# Patient Record
Sex: MALE
[De-identification: names, ages, dates, MRNs, and addresses within clinical notes are randomized per-mention and may not be internally consistent; named-entity substitution may affect disease eponyms.]

---

## 2021-07-12 ENCOUNTER — TRANSCRIPTION ENCOUNTER (OUTPATIENT)
Age: 51
End: 2021-07-12

## 2021-08-08 ENCOUNTER — TRANSCRIPTION ENCOUNTER (OUTPATIENT)
Age: 51
End: 2021-08-08

## 2021-08-10 ENCOUNTER — TRANSCRIPTION ENCOUNTER (OUTPATIENT)
Age: 51
End: 2021-08-10

## 2022-05-17 PROBLEM — Z00.00 ENCOUNTER FOR PREVENTIVE HEALTH EXAMINATION: Status: ACTIVE | Noted: 2022-05-17

## 2022-07-27 ENCOUNTER — APPOINTMENT (OUTPATIENT)
Dept: NEUROSURGERY | Facility: CLINIC | Age: 52
End: 2022-07-27

## 2022-07-27 VITALS
SYSTOLIC BLOOD PRESSURE: 156 MMHG | HEART RATE: 88 BPM | BODY MASS INDEX: 24.5 KG/M2 | DIASTOLIC BLOOD PRESSURE: 89 MMHG | WEIGHT: 175 LBS | HEIGHT: 71 IN | TEMPERATURE: 97.8 F | OXYGEN SATURATION: 98 %

## 2022-07-27 PROCEDURE — 99203 OFFICE O/P NEW LOW 30 MIN: CPT

## 2022-07-28 NOTE — CONSULT LETTER
[Dear  ___] : Dear  [unfilled], [Courtesy Letter:] : I had the pleasure of seeing your patient, [unfilled], in my office today. [Sincerely,] : Sincerely, [FreeTextEntry2] : Karlie Morel MD\[address]\[place], [place], [address] [FreeTextEntry1] : Mr. Niño is a very pleasant 52-year-old male patient who presented to our office today to review his MRI scan findings.\par \par The patient endorses an approximate 4-month history of left-sided neck pain radiating into the shoulder and to the back of the head.  The patient does not recall any specific inciting event but states that he has been very reluctant to move his head since the onset for fear of making things worse.  The patient does not have any numbness or tingling or any radiating symptoms into his hands.  The patient has been taking Tylenol as needed for his pain and had participated in 1 or 2 sessions of physical therapy previously.\par \par On examination, the patient is alert, oriented, and compliant with the exam.  The patient demonstrates full strength in the upper and lower extremities bilaterally.  The patient demonstrates 2+ reflexes in the upper and lower extremities bilaterally.  The patient ambulates well.  The patient has severely limited range of motion of the neck secondary to pain and apprehension.\par \par The patient's past medical history is noncontributory.  The patient denies allergies to medications.\par \par The patient is accompanied with an MRI scan of the cervical spine dated July 15, 2022.  These images demonstrate mild to moderate degenerative changes in the cervical spine without nerve root or cord compression. The patient additionally is accompanied with an x-ray of the cervical spine dated May 11, 2022 which was reported unremarkable.  The patient does have a slightly rightward tilt possibly suggestive of a muscle spasm.  The patient maintains good cervical lordosis.\par \par Taken together, the patient has a clinical history and radiographic findings most consistent with a muscle spasm.  Unfortunately, it appears that the patient has been so reluctant to move his neck that he has exacerbated muscle disuse and the muscle spasm.  I have provided the patient a prescription for muscle relaxants in an effort to loosen his neck and reassured the patient all of his MRI scans are benign.  From a recovery perspective, I explained to the patient that he must continue to move his neck and to perform stretching exercises to loosen the muscles.  I have also provided a prescription for physical therapy and massage therapy to help with this.  Finally, I explained to the patient that, in severe instances, the patient may be a candidate for Botox injections by pain management physician to help loosen the muscle.  At this time, the patient would like to defer this particular modality.  At this time I explained the patient that he does not need routine neurosurgical follow-up but we would be happy to help the patient in the future should the need arise. [FreeTextEntry3] : Cy Abbott MD, PhD, CS, FAANS Attending Neurosurgeon  of Neurosurgery [facility] at [facility] at [address], [place] [address] Office: [phone] Fax: [phone]

## 2022-08-18 ENCOUNTER — RX CHANGE (OUTPATIENT)
Age: 52
End: 2022-08-18

## 2022-08-18 ENCOUNTER — APPOINTMENT (OUTPATIENT)
Dept: GASTROENTEROLOGY | Facility: CLINIC | Age: 52
End: 2022-08-18

## 2022-08-18 VITALS
HEIGHT: 71 IN | WEIGHT: 179 LBS | HEART RATE: 104 BPM | BODY MASS INDEX: 25.06 KG/M2 | SYSTOLIC BLOOD PRESSURE: 172 MMHG | DIASTOLIC BLOOD PRESSURE: 105 MMHG

## 2022-08-18 DIAGNOSIS — R49.0 DYSPHONIA: ICD-10-CM

## 2022-08-19 ENCOUNTER — RX CHANGE (OUTPATIENT)
Age: 52
End: 2022-08-19

## 2022-08-31 ENCOUNTER — APPOINTMENT (OUTPATIENT)
Dept: GASTROENTEROLOGY | Facility: AMBULATORY MEDICAL SERVICES | Age: 52
End: 2022-08-31

## 2022-09-02 ENCOUNTER — NON-APPOINTMENT (OUTPATIENT)
Age: 52
End: 2022-09-02

## 2022-09-06 ENCOUNTER — APPOINTMENT (OUTPATIENT)
Dept: GASTROENTEROLOGY | Facility: AMBULATORY MEDICAL SERVICES | Age: 52
End: 2022-09-06

## 2022-09-06 ENCOUNTER — RESULT REVIEW (OUTPATIENT)
Age: 52
End: 2022-09-06

## 2022-09-06 DIAGNOSIS — K25.3 ACUTE GASTRIC ULCER W/OUT HEMORRHAGE OR PERFORATION: ICD-10-CM

## 2022-09-06 PROCEDURE — 43239 EGD BIOPSY SINGLE/MULTIPLE: CPT | Mod: GC

## 2022-09-14 ENCOUNTER — APPOINTMENT (OUTPATIENT)
Dept: PHYSICAL MEDICINE AND REHAB | Facility: CLINIC | Age: 52
End: 2022-09-14

## 2022-10-04 ENCOUNTER — APPOINTMENT (OUTPATIENT)
Dept: PHYSICAL MEDICINE AND REHAB | Facility: CLINIC | Age: 52
End: 2022-10-04

## 2022-10-04 VITALS
RESPIRATION RATE: 12 BRPM | HEIGHT: 67 IN | BODY MASS INDEX: 27.15 KG/M2 | WEIGHT: 173 LBS | DIASTOLIC BLOOD PRESSURE: 110 MMHG | HEART RATE: 99 BPM | SYSTOLIC BLOOD PRESSURE: 175 MMHG

## 2022-10-04 DIAGNOSIS — Z78.9 OTHER SPECIFIED HEALTH STATUS: ICD-10-CM

## 2022-10-04 PROCEDURE — 99204 OFFICE O/P NEW MOD 45 MIN: CPT | Mod: 25

## 2022-10-04 PROCEDURE — 20552 NJX 1/MLT TRIGGER POINT 1/2: CPT

## 2022-10-04 RX ORDER — FLUCONAZOLE 100 MG/1
100 TABLET ORAL DAILY
Qty: 10 | Refills: 1 | Status: COMPLETED | COMMUNITY
Start: 2022-09-06 | End: 2022-10-04

## 2022-10-04 RX ORDER — AMOXICILLIN 500 MG/1
500 TABLET, FILM COATED ORAL
Qty: 58 | Refills: 0 | Status: COMPLETED | COMMUNITY
Start: 2022-09-09 | End: 2022-10-04

## 2022-10-04 RX ORDER — CLARITHROMYCIN 500 MG/1
500 TABLET, FILM COATED ORAL TWICE DAILY
Qty: 28 | Refills: 0 | Status: COMPLETED | COMMUNITY
Start: 2022-09-09 | End: 2022-10-04

## 2022-10-04 RX ORDER — GABAPENTIN 300 MG/1
300 CAPSULE ORAL
Qty: 60 | Refills: 1 | Status: ACTIVE | COMMUNITY
Start: 2022-10-04 | End: 1900-01-01

## 2022-10-04 RX ORDER — SODIUM SULFATE, POTASSIUM SULFATE, MAGNESIUM SULFATE 17.5; 3.13; 1.6 G/ML; G/ML; G/ML
17.5-3.13-1.6 SOLUTION, CONCENTRATE ORAL
Qty: 1 | Refills: 0 | Status: COMPLETED | COMMUNITY
Start: 2022-08-18 | End: 2022-10-04

## 2022-10-04 NOTE — PROCEDURE
[de-identified] : Reason for procedure: LEFT CERVICAL MYOFASCIAL PAIN\par \par Procedure: TRIGGER POINT INJECTIONS\par Physician: LUIS ANGEL SILVA DO\par Medication injected: 1 cc dexamethasone, 3 cc Lidocaine 1% (total)\par Sedation medications: None\par Estimated blood loss: None\par Complications: None\par \par Technique: R/B/A to LEFT MID-UPPER CERVICAL trigger point injections reviewed with patient. The patient is agreeable and wishes to proceed.  Signed consent form to be scanned into EMR.  The patient was placed in a seated position and 3 trigger points of  LEFT MID-UPPER CERVICAL PARAVERTEBRAL MUSCLES were identified. The areas to be injected were prepped in normal sterile fashion with Chloroprep.  A 27 gauge, 1.25 inch needle was advanced into each palpable trigger point with reproduction of pain.  After negative aspiration of heme, the above medications were injected into the trigger area. Needle was then removed, band aid placed over injection sites. There were no complications.  The patient was provided with post injection instructions and noted improvement in pain and ROM after injection. \par \par

## 2022-10-04 NOTE — HISTORY OF PRESENT ILLNESS
[FreeTextEntry1] : 52 y.o. EVER CHAU presents to office w/ c/o chronic neck pain for last 7-8 months.  Pt. denies antecedent trauma or injury to C-spine.  Pt. states that he awoke with the pain.  Pain can radiate into occiput and left temporal region.  Pt. states that he is unable to sleep on his left side 2' head/neck pain.  Denies distal radiation of pain down left arm.  No N/T/W/B in hand.  X-rays and MRI C-spine done and reviewed below.  Pt. states that he 2 separate courses of P.T. w/o relief of sxs.  First course 10 sessions; 2nd course 18 sessions.  Not taking NSAIDs regularly.  Takes 2 tabs OTC acetaminophen/day.  No h/o spinal injections.

## 2022-10-04 NOTE — DATA REVIEWED
[MRI] : MRI [FreeTextEntry1] : MRI C-spine (July 2022): Disc bulges and central disc protrusions at C5-6 and C6-7 contributing to mild spinal canal narrowing without cord signal abnormality. Multilevel uncinate and facet hypertrophy as detailed above contributing to\par moderate right foraminal stenosis at C4-5 and C5-6.

## 2022-10-04 NOTE — PHYSICAL EXAM
[FreeTextEntry1] : NAD\par A&Ox3\par Non-obese\par C-spine ROM: 1 FB chin-to-sternum; 10' extension w/ pain; 15-20' LR either side\par Rodgers's: neg\par Lhermitte's: neg\par Spurling's: unable to position\par DTR's: 2+ B/T/Br\par MMT: 5/5 b/l UE\par Sensation: SILT.  No decrement to PP testing C5-T1 dermatomes\par Palpation: left sided super trap and mid-lower cervical paravertebral TPs\par

## 2022-10-04 NOTE — ASSESSMENT
[FreeTextEntry1] : 52 y.o. M w/ h/o chronic neck pain radiating into left occiput.  I spent most of today's office visit (40 min) reviewing the patient's MRI, discussing etiology, pathogenesis and further non-operative management.  MRI c/w mild DDD w/o significant central canal or NF stenosis.  Patient's has significant component of myofascial pain w/ radiation into occiput.  3 TPIs administered at today's office visit.  No need for KRUPA as patient denies radicular pain down arm.  May consider Botox injections vs. referral for cervical MBBs depending on patient's response to today's intervention.  Discussed R/B/A to low dose gabapentin 300 mg; 1 cap po qhs x 2 days, then one cap po bid as tolerated.  Reviewed proper HEP.  Pt. is in agreement with plan.  All questions answered.  RTC 4 weeks.

## 2022-10-26 ENCOUNTER — APPOINTMENT (OUTPATIENT)
Dept: GASTROENTEROLOGY | Facility: CLINIC | Age: 52
End: 2022-10-26

## 2022-10-26 VITALS
HEIGHT: 67 IN | DIASTOLIC BLOOD PRESSURE: 100 MMHG | HEART RATE: 94 BPM | BODY MASS INDEX: 26.68 KG/M2 | WEIGHT: 170 LBS | SYSTOLIC BLOOD PRESSURE: 171 MMHG

## 2022-10-26 DIAGNOSIS — Z12.12 ENCOUNTER FOR SCREENING FOR MALIGNANT NEOPLASM OF COLON: ICD-10-CM

## 2022-10-26 DIAGNOSIS — Z12.11 ENCOUNTER FOR SCREENING FOR MALIGNANT NEOPLASM OF COLON: ICD-10-CM

## 2022-10-26 DIAGNOSIS — R49.9 UNSPECIFIED VOICE AND RESONANCE DISORDER: ICD-10-CM

## 2022-10-26 PROCEDURE — 99213 OFFICE O/P EST LOW 20 MIN: CPT

## 2022-10-26 RX ORDER — SUCRALFATE 1 G/1
1 TABLET ORAL
Qty: 270 | Refills: 0 | Status: ACTIVE | COMMUNITY
Start: 2022-10-26 | End: 1900-01-01

## 2022-10-26 RX ORDER — PANTOPRAZOLE 40 MG/1
40 TABLET, DELAYED RELEASE ORAL
Qty: 180 | Refills: 0 | Status: ACTIVE | COMMUNITY
Start: 2022-09-06 | End: 1900-01-01

## 2022-10-30 NOTE — REVIEW OF SYSTEMS
[Sore Throat] : sore throat [Hoarseness] : hoarseness [Negative] : Heme/Lymph [Loss Of Hearing] : no hearing loss

## 2022-10-30 NOTE — ASSESSMENT
[FreeTextEntry1] : Plan:\par Since pt is still symptomatic likely from gastric ulcer, will continue PPI. Will also add sucralfate for symptom management. Since pt is due for screening colonoscopy, will perform with EGD to evaluate healing of ulcer. Risks versus benefits as well as instructions reviewed, pt agrees to planned procedure. All questions answered, pt expressed understanding. Discussed with Dr. Crowe. I have spent 25 minutes on this encounter.

## 2022-10-30 NOTE — HISTORY OF PRESENT ILLNESS
[FreeTextEntry1] : Don Niño is a 52 year old male presenting today for follow up. Had EGD for throat hoarseness and chronic cough, was found to have H. Pylori as well as an ulcer, and esophageal candidiasis. Pt took H. Pylori treatment, and diflucan as instructed. Is still taking pantoprazole as instructed, has had no relief of his hoarseness or chronic cough. Of note, pt was also recommended to schedule screening colonoscopy at last visit but EGD took priority because pt was symptomatic.

## 2022-11-01 ENCOUNTER — APPOINTMENT (OUTPATIENT)
Dept: PHYSICAL MEDICINE AND REHAB | Facility: CLINIC | Age: 52
End: 2022-11-01

## 2022-11-01 VITALS
HEIGHT: 67 IN | DIASTOLIC BLOOD PRESSURE: 92 MMHG | WEIGHT: 170 LBS | SYSTOLIC BLOOD PRESSURE: 157 MMHG | HEART RATE: 109 BPM | BODY MASS INDEX: 26.68 KG/M2 | RESPIRATION RATE: 12 BRPM

## 2022-11-01 DIAGNOSIS — M54.2 CERVICALGIA: ICD-10-CM

## 2022-11-01 DIAGNOSIS — M47.812 SPONDYLOSIS W/OUT MYELOPATHY OR RADICULOPATHY, CERVICAL REGION: ICD-10-CM

## 2022-11-01 PROCEDURE — 99214 OFFICE O/P EST MOD 30 MIN: CPT

## 2022-11-01 NOTE — ASSESSMENT
[FreeTextEntry1] : 52 y.o. M w/ h/o chronic neck pain radiating into left occiput.  I spent most of today's office visit (25 min) discussing etiology, pathogenesis and further non-operative management.  MRI previously reviewed c/w mild DDD w/o significant central canal or NF stenosis.  Although patient experienced some symptomatic relief from his left temporal HA, I explained that it may have been serendipitous as some of the injectate likely spread along the articular branches of his upper cervical FJs.  Discussed utility of cervical MBBs x2 -> ? RFA given his pain referral patterns and marked restriction in ROM.  Will refer to Dr. Gibson or Dr. Villeda for procedure.  May c/w gabapentin 300 mg; 2 cap po qhs as tolerated.  Reviewed proper HEP.  Pt. is in agreement with plan.  All questions answered.  RTC prn.

## 2022-11-01 NOTE — PHYSICAL EXAM
[FreeTextEntry1] : NAD\par A&Ox3\par Non-obese\par No significant change in today's examination\par C-spine ROM: 1 FB chin-to-sternum; 10' extension w/ pain; 15-20' LR either side w/ pain (static)\par Rodgers's: neg\par Lhermitte's: neg\par Spurling's: unable to position\par DTR's: 2+ B/T/Br\par MMT: 5/5 b/l UE\par Sensation: SILT.  No decrement to PP testing C5-T1 dermatomes\par Palpation: left sided super trap and mid-lower cervical paravertebral TPs\par

## 2022-11-04 ENCOUNTER — APPOINTMENT (OUTPATIENT)
Dept: PHYSICAL MEDICINE AND REHAB | Facility: CLINIC | Age: 52
End: 2022-11-04

## 2022-11-04 VITALS
HEART RATE: 91 BPM | TEMPERATURE: 97.34 F | OXYGEN SATURATION: 100 % | DIASTOLIC BLOOD PRESSURE: 95 MMHG | SYSTOLIC BLOOD PRESSURE: 165 MMHG

## 2022-11-09 ENCOUNTER — TRANSCRIPTION ENCOUNTER (OUTPATIENT)
Age: 52
End: 2022-11-09

## 2022-11-09 ENCOUNTER — APPOINTMENT (OUTPATIENT)
Dept: PHYSICAL MEDICINE AND REHAB | Facility: CLINIC | Age: 52
End: 2022-11-09

## 2022-11-09 VITALS — SYSTOLIC BLOOD PRESSURE: 163 MMHG | OXYGEN SATURATION: 97 % | DIASTOLIC BLOOD PRESSURE: 93 MMHG | HEART RATE: 91 BPM

## 2022-11-09 DIAGNOSIS — M47.812 SPONDYLOSIS W/OUT MYELOPATHY OR RADICULOPATHY, CERVICAL REGION: ICD-10-CM

## 2022-11-09 PROCEDURE — 99215 OFFICE O/P EST HI 40 MIN: CPT

## 2022-11-09 RX ORDER — MELOXICAM 7.5 MG/1
7.5 TABLET ORAL TWICE DAILY
Qty: 30 | Refills: 1 | Status: DISCONTINUED | COMMUNITY
Start: 2022-07-27 | End: 2022-11-09

## 2022-11-09 RX ORDER — TIZANIDINE 2 MG/1
2 TABLET ORAL
Qty: 45 | Refills: 0 | Status: ACTIVE | COMMUNITY
Start: 2022-11-09 | End: 1900-01-01

## 2022-11-09 RX ORDER — CYCLOBENZAPRINE HYDROCHLORIDE 10 MG/1
10 TABLET, FILM COATED ORAL
Qty: 30 | Refills: 0 | Status: DISCONTINUED | COMMUNITY
Start: 2022-07-27 | End: 2022-11-09

## 2022-11-09 NOTE — PHYSICAL EXAM
[FreeTextEntry1] : General exam \par \par Constitutional: The patient appears well-developed, well-nourished, and in no apparent distress. Patient is well-groomed.  \par \par Skin: The skin is warm and dry, with normal turgor.  No rashes or lesions are noted.  \par \par Eyes: PERRL.  \par \par ENMT: Ears: Hearing is grossly within normal limits.  \par \par Neck: Supple: The neck is supple.  \par \par Respiratory: Inspection: Breathing unlabored.  \par \par Neurologic: Alert and oriented x 3. \par \par Psychiatric: Patient is cooperative and appropriate.  Mood and affect are normal.  Patient's insight is good, and memory and judgment are intact.\par \par CERVICAL EXAM\par \par APPEARANCE:\par No visible scars\par No gross deformity or malalignment\par No erythema, swelling or ecchymosis\par Normal temperature to touch\par Normal cervical lordosis\par No muscle atrophy of the left upper extremity\par No muscle atrophy of the right upper extremity\par No clubbing, cyanosis, swelling or erythema on the left upper extremity\par No clubbing, cyanosis, swelling or erythema on the right upper extremity\par \par TENDERNESS:\par Multiple trigger points over bilateral trapezius muscles\par Absent over midline spinous processes\par +over left cervical facet joints\par Absent over right cervical facet joints \par +over left cervical paraspinal muscles and Trapezius \par Absent over right cervical paraspinal muscles and Trapezius\par \par ROM:\par Severely limited AROM of the cervical spine\par Severely limited PROM of the cervical spine\par +pain with flexion, extension of the cervical spine\par +pain with left side bending\par +pain with right side bending\par +pain with left rotation\par +pain with right rotation\par \par SPECIAL TESTS:\par Normal left Spurling test\par Normal right Spurling test\par Negative Lhermitte´s sign\par Normal cervical compression test\par \par SENSORY TESTING:\par Intact to light touch Left C3-T2\par Intact to light touch Right C3-T2\par \par MOTOR TESTING:\par Muscle tone of the left upper extremity is normal\par Muscle tone of the right upper extremity is normal\par \par Hand  strength Left 5/5\par Hand  strength Right 5/5\par \par Finger abductor strength Left 5/5\par Finger abductor strength Right 5/5\par \par Elbow flexion strength Left 5/5\par Elbow flexion strength Right 5/5\par \par Elbow extension strength Left 5/5\par Elbow extension strength Right 5/5\par \par Shoulder flexion strength Left 5/5\par Shoulder flexion strength Right 5/5\par \par Shoulder extension strength Left 5/5\par Shoulder extension strength Right 5/5\par \par Shoulder abduction strength Left 5/5\par Shoulder abduction strength Right 5/5\par \par REFLEXES:\par Rodgers sign left negative\par Rodgers sign right negative\par \par Biceps (C5) left 2+\par Biceps (C5) right 2+\par \par \par GAIT:\par Non-antalgic gait\par Able to walk on toes\par Able to walk on heels\par Balance normal with ambulation

## 2022-11-09 NOTE — HISTORY OF PRESENT ILLNESS
[FreeTextEntry1] : SCARLET HUYNH is an 52 year old M here with his wife for initial evaluation of chronic neck pains with frequent acute exacerbations. Mr. GAGE HUYNH was sent for consultation by Dr. Arreola for possible cervical Medial Branch Block injection. Patient has Trigger point injections x2 with some temporary relief.\par \par Patient was incidentally also diagnosed with GERD due to H pylori and is in process of treatment by GI.\par \par Pain location: neck\par Quality: aching\par Radiation: denies\par Severity: 10/10\par Onset: 10 months ago\par Associated symptoms: muscle spasms, difficulty turning head\par Numbness: denies\par Weakness: denies\par Exacerbated by: neck movement, sleeping\par Improved by: rest, Trigger point injections\par Bowel or bladder involvement: denies\par \par Denies bowel/bladder dysfunction, saddle anesthesia, fevers, chills, weight loss, night pain, or night sweats at this time.\par \par The pain interferes with function, ADLs and quality of life.\par Patient had tried Acetaminophen, NSAIDs, prescription pain medications, muscle relaxants without any lasting relief of pain.\par \par Patient had cervical MRI  imaging studies to evaluate the pain. \par Patient had tried physical therapy, exercises, stretching, lifestyle modification for over 18 sessions without any significant relief.\par

## 2022-11-09 NOTE — DATA REVIEWED
[MRI] : MRI [FreeTextEntry1] : EXAM: MRI -CERVICAL SPINE NON CONTRAST 7/15/22\par HISTORY: M54.2 Cervical Neck Pain\par Neck pain for 3 to 4 months,\par TECHNIQUE: Axial and sagittal images multi-weighted sequences of the cervical\par spine were obtained on a 3.0 Teresa magnet.\par COMPARISON; Cervical spine radiographs 5/11/2022.\par FINDINGS:\par POSTERIOR FOSSA AND BRAIN STEM: Unremarkabie, The cerebellar tonsils terminate\par in normal position without a Chiuri malformation.\par SPINAL CORD: Normal cord signal characteristics w thout edema or myelomalacia\par from the cervical medullary junction to the visualized apper thoracic cord-\par OSSEOUS STRUCTURES/MARROW: Verlebral bodies are normal height withous fiactures\par or marrow replacing lesion.\par ALIGNMENT: No vertebral body listhesis is present.\par PARASPINAL SOFT TISSUES, Paraspinal soft tissues are unremarkable\par THYROID Thyroid is umremarkable.\par C2-C3: Right uncinate hypertrophy without spinal canal or foraminal sienosis.\par C3-CA: Minimal dis bulge ecoentric to the right without significant spinal\par canal or foraminal stenosis.\par C4-CS: Minimal disc bulge with right uncinate and facet hypertrophy causing mild\par to moderate right foraminal stenosis without significat central spinal canal\par narrowing and\par CS-06: Disc bulge and central disc protrusion effaces the ventral thecal sac\par contributing to mild spinal canal stenosis. There is uncinate and facet\par hypertrophy contributing to moderate right foruminal tenosis\par C6-C7: Disc bulge wich right greater tan left uncinate and fncet hypertrophy.\par There is superimposad central disc protrusion effacing the ventral check hollie\par causing minimal spinal canal stenoria, No significant forarnimal atenaai*,\par C1-TI; No dise bernintion, central canal, or foraminal stenos*\par IMPRESSION:\par Pam bulges and central disc protrusions at C5-6 and C6-7 contributing to mild\par spinal canal narrowing without cord signal abnormaliy.\par Multilevel uncinate and faoet hypertrophy as detailed above contributing to\par moderate right foraminal sienosis at C4-5 and C5-6

## 2022-11-09 NOTE — ASSESSMENT
[FreeTextEntry1] : 52-year-old male with chronic neck pains here with acute on chronic exacerbation due to cervical spondylosis without myelopathy.  Patient also has GERD with H. pylori..\par \par Patient reassured and educated on the diagnosis and treatment options.\par \par Dr. Arreola note from November 1, 2022 reviewed\par \par Cervical MRI reviewed\par \par Advised patient to hold off any anti-inflammatories due to his uncontrolled GERD\par He is in the process of seeing another GI specialist as his previous one did not have any earlier appointments\par \par Start Tizanidine 2mg 1-2 tablets PO qHS PRN pain, dispense #30. Rx sent. Patient warned about the sedative nature of this medication and recommended not to drive or to handle heavy machinery.\par \par Patient had tried and failed conservative treatment. This includes but is not limited to: Acetaminophen, NSAIDs, muscle relaxants, neuropathic medications, physician directed home exercises and/or physical therapy for at least 8 weeks. After a thorough discussion of risks and benefits patient would like to proceed with left C3-4, C4-5, C5-6 medial branch blocks with fluoroscopic guidance with a goal to do radiofrequency ablation in the future. \par \par Based on the history, physical exam and diagnostic findings, patient will benefit from this procedure. The goal of this procedure is to reduce pain and inflammation, improve function and range of motion and to further promote increased activity and return to previous level of functioning. The ultimate goal of performing this procedure is to improve patient's quality of life.\par \par Asking for authorization for left C3-4, C4-5, C5-6 medial branch blocks with fluoroscopic guidance with a goal to do radiofrequency ablation in the future to help treat patient´s pain.  Patient will be scheduled for this procedure.\par \par This note was generated using Dragon medical dictation software. A reasonable effort had been made for proofreading its contents, but spelling mistakes or grammatical errors may still remain. If there are any questions or points of clarification needed please notify my office.

## 2022-11-09 NOTE — REVIEW OF SYSTEMS
[Chest Pain] : no chest pain [Shortness Of Breath] : no shortness of breath [Abdominal Pain] : no abdominal pain [Dysuria] : no dysuria [Negative] : ENT [FreeTextEntry9] : neck and back pains

## 2022-11-14 ENCOUNTER — EMERGENCY (EMERGENCY)
Facility: HOSPITAL | Age: 52
LOS: 0 days | Discharge: HOME | End: 2022-11-14
Attending: EMERGENCY MEDICINE
Payer: MEDICAID

## 2022-11-14 VITALS
HEART RATE: 91 BPM | SYSTOLIC BLOOD PRESSURE: 157 MMHG | DIASTOLIC BLOOD PRESSURE: 95 MMHG | TEMPERATURE: 99 F | RESPIRATION RATE: 18 BRPM | OXYGEN SATURATION: 99 %

## 2022-11-14 PROCEDURE — 99283 EMERGENCY DEPT VISIT LOW MDM: CPT

## 2022-11-14 PROCEDURE — 99284 EMERGENCY DEPT VISIT MOD MDM: CPT

## 2022-11-14 RX ORDER — LIDOCAINE 4 G/100G
1 CREAM TOPICAL
Qty: 7 | Refills: 0
Start: 2022-11-14 | End: 2022-11-20

## 2022-11-14 RX ORDER — LIDOCAINE 4 G/100G
1 CREAM TOPICAL ONCE
Refills: 0 | Status: COMPLETED | OUTPATIENT
Start: 2022-11-14 | End: 2022-11-14

## 2022-11-14 RX ORDER — ACETAMINOPHEN 500 MG
2 TABLET ORAL
Qty: 24 | Refills: 0
Start: 2022-11-14 | End: 2022-11-16

## 2022-11-14 RX ORDER — CYCLOBENZAPRINE HYDROCHLORIDE 10 MG/1
1 TABLET, FILM COATED ORAL
Qty: 9 | Refills: 0
Start: 2022-11-14 | End: 2022-11-16

## 2022-11-14 RX ORDER — ACETAMINOPHEN 500 MG
650 TABLET ORAL ONCE
Refills: 0 | Status: COMPLETED | OUTPATIENT
Start: 2022-11-14 | End: 2022-11-14

## 2022-11-14 RX ORDER — DIAZEPAM 5 MG
5 TABLET ORAL ONCE
Refills: 0 | Status: DISCONTINUED | OUTPATIENT
Start: 2022-11-14 | End: 2022-11-14

## 2022-11-14 RX ADMIN — Medication 5 MILLIGRAM(S): at 11:05

## 2022-11-14 RX ADMIN — Medication 650 MILLIGRAM(S): at 11:05

## 2022-11-14 RX ADMIN — LIDOCAINE 1 PATCH: 4 CREAM TOPICAL at 11:09

## 2022-11-14 NOTE — ED STATDOCS - PATIENT PORTAL LINK FT
You can access the FollowMyHealth Patient Portal offered by [facility] by registering at the following website: [email or web address]. By joining [facility]’s FollowMyHealth portal, you will also be able to view your health information using other applications (apps) compatible with our system.

## 2022-11-14 NOTE — ED ADULT TRIAGE NOTE - CHIEF COMPLAINT QUOTE
Pt arrives to ED complaining of lower middle back pain for two days. denies trauma. denies medical history.

## 2022-11-14 NOTE — ED STATDOCS - PROGRESS NOTE DETAILS
Plan for muscle relaxer, Tylenol, follow with his physician.  Joanna Sparrow PA-C Pt. is a 52 year old male hx chronic neck pain, due for epidural this week, HTN, H. Pylori presents with left lower back pain x 1 day.  Pt. denies trauma.  Pt. took Tylenol with improvement.  Pain does not radiate down leg.  No urinary complaints.  Neuro:  Dr. Villeda.  Joanna Sparrow PA-C

## 2022-11-14 NOTE — ED STATDOCS - SKIN, MLM
Clinical Summary

 Created on: 2018



Malcolm Talley

External Reference #: [id number]

: 1993

Sex: Male



Demographics







 Address  [address]  [address]

 

 Home Phone  [phone]

 

 Preferred Language  English

 

 Marital Status  Single

 

 Holiness Affiliation  Unknown

 

 Race  White

 

 Ethnic Group  Non-





Author







 Author  [facility]

 

 Organization  [facility]

 

 Address  Unknown

 

 Phone  Unavailable







Support







 Name  Relationship  Address  Phone

 

 Marielle Palacios  Unknown  [phone]







Care Team Providers







 Care Team Member Name  Role  Phone

 

 Asked, Pcp  PCP  Unavailable







Allergies

No Known Allergies



Current Medications

No known medications



Active Problems





Not on file



Encounters







    



  Date   Type   Specialty   Care Team   Description

 

    



  2018   Emergency   Emergency Medicine   Prashant Munoz MD   
Palpitations (Primary Dx)



after 2017



Social History







    



  Tobacco Use   Types   Packs/Day   Years Used   Date

 

    



  Never Smoker    

 

    



  Smokeless Tobacco: Never   



  Used   









   



  Alcohol Use   Drinks/Week   oz/Week   Comments

 

   



  Yes     "socially."









 



  Sex Assigned at Birth   Date Recorded

 

 



  Not on file 







Last Filed Vital Signs







  



  Vital Sign   Reading   Time Taken

 

  



  Blood Pressure   170/96   2018  8:41 AM CDT

 

  



  Pulse   50   2018  8:41 AM CDT

 

  



  Temperature   36.7   C (98.1   F)   2018  8:41 AM CDT

 

  



  Respiratory Rate   18   2018  8:41 AM CDT

 

  



  Oxygen Saturation   97%   2018  8:41 AM CDT

 

  



  Inhaled Oxygen   -   -



  Concentration  

 

  



  Weight   -   -

 

  



  Height   182.9 cm (6')   2018  6:12 AM CDT

 

  



  Body Mass Index   -   -







Plan of Treatment







   



  Health Maintenance   Due Date   Last Done   Comments

 

   



  INFLUENZA VACCINE   2018  







Results

* XR Chest 2 Vw (2018  7:14 AM)





 



  Specimen   Performing Laboratory

 

 



    [facility]



   [address] [place] [address]









 Narrative

 

 



EXAMINATION:XR CHEST 2 VW



 



CLINICAL HISTORY:Chest Pain



 



COMPARISON:No prior



 



IMPRESSION:



 



1. The heart and pulmonary vasculature are normal. There are no acute 
infiltrates or effusions. The lungs are clear. Osseous structures intact.




 



[facility]-[id number]



 









 Procedure Note

 

 



 Interface, Radiology Results Incoming - 2018  7:19 AM CDT



EXAMINATION:  XR CHEST 2 VW



CLINICAL HISTORY:  Chest Pain



COMPARISON:  No prior



IMPRESSION:



 1. The heart and pulmonary vasculature are normal. There are no acute 
infiltrates or effusions. The lungs are clear. Osseous structures intact.    



[facility]-[id number]







* ECG 12 lead (2018  6:30 AM)





  



  Component   Value   Ref Range

 

  



  Ventricular rate   65 

 

  



  Atrial rate   65 

 

  



  NJ interval   128 

 

  



  QRSD interval   94 

 

  



  QT interval   372 

 

  



  QTC interval   386 

 

  



  P axis 1   53 

 

  



  QRS axis 1   49 

 

  



  T wave axis   57 

 

  



  EKG impression   Normal sinus rhythm with sinus arrhythmia-Normal 



   ECG-No previous ECGs available-Electronically 



   Signed By William Pickard MD ([id number]) on 2018 



   10:20:43 PM 









 



  Specimen   Performing Laboratory

 

 



   [facility]



   [address] [place] [address]





* Estimated GFR (2018  6:25 AM)





  



  Component   Value   Ref Range

 

  



  GFR Non Af Amer   82   mL/min/1.73 m2

 

  



  GFR Af Amer   >90   mL/min/1.73 m2



   Comment: 



   Chronic kidney disease: <60 mL/min/1.73m2 



   Kidney failure: <15 mL/min/1.73m2 



   The estimated GFR is calculated from the 



   IDMS-traceable Modification of Diet 



   in Renal Disease Equation. The accuracy of the 



   calculation is poor when the 



   creatinine is normal. Calculated values >90 



   mL/min/1.73m2 are not reported. 



   This equation has not been validated in children 



   (<18 years), pregnant 



   women, the elderly (>70 years), or ethnic groups 



   other than Caucasians and 



    Americans. 









 



  Specimen   Performing Laboratory

 

 



  Plasma specimen   [facility]PART[facility] OF PATHOLOGY AND GENOMIC MEDICINE



   [address] [place] [address] [address]



   [place] [address]





* Troponin (2018  6:25 AM)





  



  Component   Value   Ref Range

 

  



  Troponin   <0.01   0.00 - 0.60 ng/mL



   Comment: 



   0.11 - 1.49 ng/ml                May indicate 



   increased risk of acute 



   coronary 



   syndrome. 



   >=1.5 ng/ml                            Consistent 



   with acute myocardial 



   infarction. 



   The diagnostic value of a single normal or 



   non-diagnostic 



   result is questionable.    Serial samples at 2-6 



   hour intervals 



   are required to rule out acute myocardial 



   injury. 









 



  Specimen   Performing Laboratory

 

 



  Plasma specimen   [facility] DEPARTMENT OF PATHOLOGY AND GENOMIC MEDICINE



   [address] [place] [address] [address]



   [place] [address]





* D-dimer (2018  6:25 AM)





  



  Component   Value   Ref Range

 

  



  D-dimer   <0.27   0.00 - 0.40 ug/mL FEU



   Comment: 



   Units are ug/ml Fibrinogen Equivalent Unit. 



   When combined with low clinical probability, 



   D-dimer results 



   of less than 0.5 ug/ml FEU have a good 



   negative    predictive 



   value in excluding PE or DVT. 



   For D-dimer results greater than 0.5    ug/ml FEU 



   further 



   testing is indicated if PE or DVT is 



   suspected    clinically. 



   Elevated D-dimer results have been reported in 



   DVT, PE, and 



   DIC cases and may indicate the presence of a clot.

 



   D-dimer results may be elevated due to old age, 



   pregnancy, 



   inflammatory diseases, trauma, post-operative 



   states, sepsis, 



   and malignancies. 









 



  Specimen   Performing Laboratory

 

 



  Blood   [facility]PART[facility] OF PATHOLOGY AND GENOMIC MEDICINE



   [address]



   [place] [address]





* CBC with platelet and differential (2018  6:25 AM)





  



  Component   Value   Ref Range

 

  



  WBC   4.5   4.2 - 11.0 k/uL

 

  



  RBC   5.65   4.04 - 5.86 m/uL

 

  



  HGB   15.3   13.0 - 17.3 g/dL

 

  



  HCT   46.8 (H)   34.0 - 45.0 %

 

  



  MCV   82.8   80.0 - 98.0 fL

 

  



  MCH   27.1   27.0 - 34.0 pg

 

  



  MCHC   32.7   31.5 - 36.5 g/dL

 

  



  RDW - SD   38.0   37.0 - 51.0 fL

 

  



  MPV   11.7 (H)   7.4 - 10.4 fL

 

  



  Platelet count   208   150 - 400 k/uL

 

  



  Nucleated RBC   0.00   /100 WBC

 

  



  Neutrophils   47.9   36.0 - 66.0 %

 

  



  Lymphocytes   40.8   24.0 - 44.0 %

 

  



  Monocytes   8.4 (H)   0.0 - 6.0 %

 

  



  Eosinophils   2.0   0.0 - 6.0 %

 

  



  Basophils   0.7   0.0 - 1.2 %

 

  



  Immature granulocytes   0.2   0.0 - 1.0 %









 



  Specimen   Performing Laboratory

 

 



  Blood   [facility] OF PATHOLOGY AND GENOMIC MEDICINE



   [address]



   [place] [address]





* B natriuretic peptide (2018  6:25 AM)





  



  Component   Value   Ref Range

 

  



  BNP   14   0 - 100 pg/mL









 



  Specimen   Performing Laboratory

 

 



  Blood   [facility] OF PATHOLOGY AND GENOMIC MEDICINE



   [address]



   [place] [address]





* Comprehensive metabolic panel (2018  6:25 AM)





  



  Component   Value   Ref Range

 

  



  Sodium   141   135 - 150 mEq/L

 

  



  Potassium   3.4 (L)   3.5 - 5.0 mEq/L

 

  



  Chloride   106   100 - 109 mEq/L

 

  



  CO2   28   24 - 32 mmol/L

 

  



  Anion gap   7@ANIO   7 - 15 mEq/L

 

  



  BUN   11   7 - 18 mg/dL

 

  



  Creatinine   1.1   0.8 - 1.5 mg/dL

 

  



  Glucose   121 (H)   65 - 100 mg/dL

 

  



  Calcium   8.5 (L)   8.6 - 10.7 mg/dL

 

  



  Protein   7.6   6.3 - 8.2 g/dL

 

  



  Albumin   3.9   3.2 - 5.0 g/dL

 

  



  A/G ratio   1.1   0.7 - 3.8

 

  



  Alkaline phosphatase   64   30 - 120 U/L

 

  



  AST   29   15 - 37 U/L

 

  



  ALT   61   30 - 65 U/L

 

  



  Total bilirubin   0.6   0.2 - 1.2 mg/dL









 



  Specimen   Performing Laboratory

 

 



  Plasma specimen   [facility] DE[facility] OF PATHOLOGY AND GENOMIC MEDICINE



   [address] [place] [address]





after 2017
skin normal color for race, warm, dry and intact.

## 2022-11-14 NOTE — ED STATDOCS - CARE PROVIDER_API CALL
Aayush Villeda (DO)  Pain Medicine; PhysicalRehab Medicine  [address] [place] [address]  Phone: [phone]  Fax: [phone]  Follow Up Time:

## 2022-11-14 NOTE — ED STATDOCS - NS ED ATTENDING STATEMENT MOD
This was a shared visit with the CAREY. I reviewed and verified the documentation and independently performed the documented:

## 2022-11-14 NOTE — ED STATDOCS - NSFOLLOWUPINSTRUCTIONS_ED_ALL_ED_FT
Dolor de espalda luh en los adultos    Acute Back Pain, Adult      El dolor de espalda luh es repentino y por lo general no dura mucho tiempo. Se debe generalmente a georgina lesión de los músculos y tejidos de la espalda. La lesión puede ser el resultado de:  •Estiramiento en exceso o desgarro de un músculo, tendón o ligamento. Los ligamentos son tejidos que conectan los huesos. Levantar algo de forma incorrecta puede producir un esguince de espalda.      •Desgaste (degeneración) de los discos vertebrales. Los discos vertebrales son tejidos circulares que proporcionan amortiguación entre los huesos de la columna vertebral (vértebras).      •Movimientos de giro, campbell al practicar deportes o realizar trabajos de jardinería.      •Un golpe en la espalda.      •Artritis.      Es posible que le realicen un examen físico, análisis de laboratorio u otros estudios de diagnóstico por imágenes para encontrar la causa del dolor. El dolor de espalda luh generalmente desaparece con reposo y cuidados en la casa.      Siga estas instrucciones en ortiz casa:    Control del dolor, la rigidez y la hinchazón     •Use los medicamentos de venta basilio y los recetados solamente campbell se lo haya indicado el médico. El tratamiento puede incluir medicamentos para el dolor y la inflamación que se prashant por la boca o que se aplican sobre la piel, o relajantes musculares.    •El médico puede recomendarle que se aplique hielo baltazar las primeras 24 a 48 horas después del comienzo del dolor. Para hacer esto:  •Ponga el hielo en georgina bolsa plástica.      •Coloque georgina toalla entre la piel y la bolsa.      •Aplique el hielo baltazar 20 minutos, 2 o 3 veces por día.      •Retire el hielo si la piel se pone de color mccain brillante. [place] es muy importante. Si no puede sentir dolor, calor o frío, tiene un mayor riesgo de que se dañe la jaden.      •Si se lo indican, aplique calor en la jaden afectada con la frecuencia que le haya indicado el médico. Use la leola de calor que el médico le recomiende, campbell georgina compresa de calor húmedo o georgina almohadilla térmica.  •Coloque georgina toalla entre la piel y la leola de calor.      •Aplique calor baltazar 20 a 30 minutos.      •Retire la leola de calor si la piel se pone de color mccain brillante. [place] es especialmente importante si no puede sentir dolor, calor o frío. Corre un mayor riesgo de sufrir quemaduras.          Actividad   Comparisons of good and bad posture while driving, standing, sitting at a desk, and lifting heavy objects.   • No permanezca en la cama. Hacer reposo en la cama por más de 1 a 2 días puede demorar ortiz recuperación.    •Mantenga georgina buena postura al sentarse y pararse. No se incline hacia adelante al sentarse ni se encorve al pararse.  •Si trabaja en un escritorio, siéntese cerca de davis para no tener que inclinarse. Mantenga el mentón hacia abajo. Mantenga el becki hacia atrás y los codos flexionados en un ángulo de 90 grados (ángulo recto).      •Cuando conduzca, siéntese elevado y cerca del volante. Agregue un apoyo para la espalda (lumbar) al asiento del automóvil, si es necesario.        •Realice caminatas cortas en superficies christian tan pronto campbell le sea posible. Trate de caminar un poco más de tiempo cada día.      • No se siente, conduzca o permanezca de pie en un mismo lugar baltazar más de 30 minutos seguidos. Pararse o sentarse baltazar largos períodos de tiempo puede sobrecargar la espalda.      • No conduzca ni use maquinaria pesada mientras julio analgésicos recetados.    •Use técnicas apropiadas para levantar objetos. Cuando se inclina y levanta un objeto, utilice posiciones que no sobrecarguen tanto la espalda:  •Flexione las rodillas.      •Mantenga la carga cerca del cuerpo.      •No se tuerza.        •Alex actividad física habitualmente campbell se lo haya indicado el médico. Hacer ejercicios ayuda a que la espalda sane más rápido y ayuda a evitar las lesiones de la espalda al mantener los músculos meghan y flexibles.      •Trabaje con un fisioterapeuta para crear un programa de ejercicios seguros, según lo recomiende el médico. Alex ejercicios campbell se lo haya indicado el fisioterapeuta.      Estilo de bryanna     •Mantenga un peso saludable. El sobrepeso sobrecarga la espalda y hace que resulte difícil tener georgina buena postura.      •Evite actividades o situaciones que lo favian sentirse ansioso o estresado. El estrés y la ansiedad aumentan la tensión muscular y pueden empeorar el dolor de espalda. Aprenda formas de manejar la ansiedad y el estrés, campbell a través del ejercicio.      Instrucciones generales     •Duerma sobre un colchón firme en goergina posición cómoda. Intente acostarse de costado, con las rodillas ligeramente flexionadas. Si se recuesta sobre la espalda, coloque georgina almohada debajo de las rodillas.    •Mantenga la grya y el becki en línea recta con la columna vertebral (posición neutra) cuando use equipos electrónicos campbell teléfonos inteligentes o tablets. Para hacer esto:  •Levante el teléfono inteligente o la tablet para mirarlo en lugar de inclinar la gray o el becki para mirar hacia abajo.      •Coloque el teléfono inteligente o la tablet al nivel de ortiz aakash mientras tushar la pantalla.      •Siga el plan de tratamiento campbell se lo haya indicado el médico. [place] puede incluir:  •Terapia cognitiva o conductual.      •Acupuntura o terapia de masajes.      •Yoga o meditación.          Comuníquese con un médico si:    •Siente un dolor que no se abad con reposo o medicamentos.      •Siente mucho dolor que se extiende a las piernas o las nalgas.      •El dolor no mejora luego de 2 semanas.      •Siente dolor por la noche.      •Pierde peso sin proponérselo.      •Tiene fiebre o escalofríos.      •Siente náuseas o vómitos.      •Siente dolor abdominal.        Solicite ayuda de inmediato si:    •Tiene nuevos problemas para controlar la vejiga o los intestinos.      •Siente debilidad o adormecimiento inusuales en los brazos o en las piernas.      •Siente que va a desmayarse.      Estos síntomas pueden representar un problema grave que constituye georgina emergencia. No espere a lucho si los síntomas desaparecen. Solicite atención médica de inmediato. Comuníquese con el servicio de emergencias de ortiz localidad (911 en los [place]). No conduzca por kobi propios medios hasta el hospital.       Resumen    •El dolor de espalda lhu es repentino y por lo general no dura mucho tiempo.      •Use técnicas apropiadas para levantar objetos. Cuando se inclina y levanta un objeto, utilice posiciones que no sobrecarguen tanto la espalda.      •[place] los medicamentos de venta basilio y los recetados solamente campbell se lo haya indicado el médico, y aplíquese calor o hielo según las indicaciones.      Esta información no tiene campbell fin reemplazar el consejo del médico. Asegúrese de hacerle al médico cualquier pregunta que tenga.

## 2022-11-14 NOTE — ED STATDOCS - ATTENDING APP SHARED VISIT CONTRIBUTION OF CARE
I personally saw the patient with the CAREY, and completed the key components of the history and physical exam. I then discussed the management plan with the CAREY.

## 2022-11-14 NOTE — ED STATDOCS - CLINICAL SUMMARY MEDICAL DECISION MAKING FREE TEXT BOX
Pt with left lower back pain. No trauma. Negative SLR. Will treat with muscle relaxers, Lidocaine patches and follow up with pain management. Pt with left lower back pain. No trauma. Negative SLR. Will treat with muscle relaxers, Lidocaine patches and follow up with pain management.            Plan for muscle relaxer, Tylenol, follow with his physician.  Joanna Sparrow PA-C

## 2022-11-14 NOTE — ED STATDOCS - OBJECTIVE STATEMENT
51 y/o male with a PMHx of chronic neck pain, H pylori, HTN presents to the ED c/o atraumatic left lower back pain since yesterday.  Pt took Tylenol yesterday with improvement. Pt woke up with pain again today so came to the ED for evaluation. Pt is scheduled for an epidural within the next 2 weeks with Dr. Naye WHITNEY. No other complaints at this time.

## 2022-11-15 DIAGNOSIS — I10 ESSENTIAL (PRIMARY) HYPERTENSION: ICD-10-CM

## 2022-11-15 DIAGNOSIS — M54.50 LOW BACK PAIN, UNSPECIFIED: ICD-10-CM

## 2022-11-28 PROBLEM — K27.9 PEPTIC ULCER, SITE UNSPECIFIED, UNSPECIFIED AS ACUTE OR CHRONIC, WITHOUT HEMORRHAGE OR PERFORATION: Chronic | Status: ACTIVE | Noted: 2022-11-14

## 2022-11-28 PROBLEM — I10 ESSENTIAL (PRIMARY) HYPERTENSION: Chronic | Status: ACTIVE | Noted: 2022-11-14

## 2022-11-28 PROBLEM — M54.2 CERVICALGIA: Chronic | Status: ACTIVE | Noted: 2022-11-14

## 2022-11-30 DIAGNOSIS — S32.000A WEDGE COMPRESSION FRACTURE OF UNSPECIFIED LUMBAR VERTEBRA, INITIAL ENCOUNTER FOR CLOSED FRACTURE: ICD-10-CM

## 2022-11-30 LAB — SARS-COV-2 N GENE NPH QL NAA+PROBE: NOT DETECTED

## 2022-12-01 ENCOUNTER — APPOINTMENT (OUTPATIENT)
Dept: GASTROENTEROLOGY | Facility: CLINIC | Age: 52
End: 2022-12-01

## 2022-12-01 VITALS
HEART RATE: 105 BPM | SYSTOLIC BLOOD PRESSURE: 158 MMHG | BODY MASS INDEX: 25.43 KG/M2 | DIASTOLIC BLOOD PRESSURE: 92 MMHG | WEIGHT: 162 LBS | HEIGHT: 67 IN

## 2022-12-01 DIAGNOSIS — B96.81 PEPTIC ULCER, SITE UNSPECIFIED, UNSPECIFIED AS ACUTE OR CHRONIC, W/OUT HEMORRHAGE OR PERFORATION: ICD-10-CM

## 2022-12-01 DIAGNOSIS — K27.9 PEPTIC ULCER, SITE UNSPECIFIED, UNSPECIFIED AS ACUTE OR CHRONIC, W/OUT HEMORRHAGE OR PERFORATION: ICD-10-CM

## 2022-12-01 DIAGNOSIS — R05.3 CHRONIC COUGH: ICD-10-CM

## 2022-12-01 PROCEDURE — 99213 OFFICE O/P EST LOW 20 MIN: CPT

## 2022-12-01 RX ORDER — AMLODIPINE BESYLATE 5 MG/1
5 TABLET ORAL
Qty: 30 | Refills: 0 | Status: ACTIVE | COMMUNITY
Start: 2022-11-26

## 2022-12-01 RX ORDER — ACETAMINOPHEN 325 MG/1
325 TABLET ORAL
Qty: 24 | Refills: 0 | Status: ACTIVE | COMMUNITY
Start: 2022-11-14

## 2022-12-01 RX ORDER — ERGOCALCIFEROL 1.25 MG/1
1.25 MG CAPSULE, LIQUID FILLED ORAL
Qty: 4 | Refills: 0 | Status: ACTIVE | COMMUNITY
Start: 2022-08-03

## 2022-12-01 RX ORDER — CODEINE PHOSPHATE AND GUAIFENESIN 10; 200 MG/5ML; MG/5ML
200-10 SYRUP ORAL
Qty: 100 | Refills: 0 | Status: ACTIVE | COMMUNITY
Start: 2022-12-01 | End: 1900-01-01

## 2022-12-01 RX ORDER — OXYCODONE AND ACETAMINOPHEN 5; 325 MG/1; MG/1
5-325 TABLET ORAL
Qty: 6 | Refills: 0 | Status: ACTIVE | COMMUNITY
Start: 2022-11-29

## 2022-12-01 NOTE — ASSESSMENT
[FreeTextEntry1] : 53 yo male with history of chronic cough and back pain. Will use codeine for cough and obtain CT scan once available. Follow up in one week.

## 2022-12-01 NOTE — HISTORY OF PRESENT ILLNESS
[FreeTextEntry1] : Mr. SCARLET HUYNH is a 52 year old male with history of chronic cough. Patient had upper endoscopy which demonstrated a gastric ulcer and candidate esophagitis. Patient was treated and has no further abdominal pain or reflux symptoms. Patient has had progressive cough keeping him up at night. Patient has also had back pain and had an MRI raise a question about a metastatic lesion to the spine. Patient is awaiting CT scan of chest abdomen and pelvis for evaluation.\par

## 2022-12-01 NOTE — PHYSICAL EXAM

## 2022-12-02 ENCOUNTER — APPOINTMENT (OUTPATIENT)
Dept: ORTHOPEDIC SURGERY | Facility: CLINIC | Age: 52
End: 2022-12-02

## 2022-12-02 ENCOUNTER — NON-APPOINTMENT (OUTPATIENT)
Age: 52
End: 2022-12-02

## 2022-12-02 ENCOUNTER — APPOINTMENT (OUTPATIENT)
Dept: PHYSICAL MEDICINE AND REHAB | Facility: CLINIC | Age: 52
End: 2022-12-02

## 2022-12-02 VITALS
HEIGHT: 67 IN | WEIGHT: 162 LBS | BODY MASS INDEX: 25.43 KG/M2 | HEART RATE: 65 BPM | DIASTOLIC BLOOD PRESSURE: 84 MMHG | SYSTOLIC BLOOD PRESSURE: 132 MMHG

## 2022-12-02 DIAGNOSIS — D49.2 NEOPLASM OF UNSPECIFIED BEHAVIOR OF BONE, SOFT TISSUE, AND SKIN: ICD-10-CM

## 2022-12-02 DIAGNOSIS — Z82.3 FAMILY HISTORY OF STROKE: ICD-10-CM

## 2022-12-02 DIAGNOSIS — Z86.79 PERSONAL HISTORY OF OTHER DISEASES OF THE CIRCULATORY SYSTEM: ICD-10-CM

## 2022-12-02 DIAGNOSIS — M54.50 LOW BACK PAIN, UNSPECIFIED: ICD-10-CM

## 2022-12-02 DIAGNOSIS — Z80.9 FAMILY HISTORY OF MALIGNANT NEOPLASM, UNSPECIFIED: ICD-10-CM

## 2022-12-02 PROCEDURE — 72100 X-RAY EXAM L-S SPINE 2/3 VWS: CPT

## 2022-12-02 PROCEDURE — 99204 OFFICE O/P NEW MOD 45 MIN: CPT

## 2022-12-02 RX ORDER — CELECOXIB 100 MG/1
100 CAPSULE ORAL TWICE DAILY
Qty: 60 | Refills: 1 | Status: ACTIVE | COMMUNITY
Start: 2022-11-28 | End: 1900-01-01

## 2022-12-02 NOTE — PHYSICAL EXAM
[de-identified] : CONSTITUTIONAL: The patient is a very pleasant individual who is well-nourished and who appears stated age.\par PSYCHIATRIC: The patient is alert and oriented X 3 and in no apparent distress, and participates with orthopedic evaluation well.\par HEAD: Atraumatic and is nonsyndromic in appearance.\par EENT: No visible thyromegaly, EOMI.\par RESPIRATORY: Respiratory rate is regular, not dyspneic on examination.\par LYMPHATICS: There is no inguinal lymphadenopathy\par INTEGUMENTARY: Skin is clean, dry, and intact about the bilateral lower extremities and lumbar spine.\par VASCULAR: There is brisk capillary refill about the bilateral lower extremities.\par NEUROLOGIC: There are no pathologic reflexes. There is no decrease in sensation of the bilateral lower extremities on Wartenberg pinwheel examination. Deep tendon reflexes are well maintained at 2+/4 of the bilateral lower extremities and are symmetric..\par MUSCULOSKELETAL: There is no visible muscular atrophy. Manual motor strength is well maintained in the bilateral lower extremities. Range of motion of lumbar spine is well maintained. The patient ambulates in a non-myelopathic manner. Negative tension sign and straight leg raise bilaterally. Quad extension, ankle dorsiflexion, EHL, plantar flexion, and ankle eversion are well preserved. Normal secondary orthopaedic exam of bilateral hips, greater trochanteric area, knees and ankles.  Mechanically orientated low back pain. [de-identified] : Cervical x-ray 4 views have been reviewed from  radiology dated May 2022 that shows well-maintained lordosis well-maintained disc spaces and very minimal cervical spondylosis.\par \par Cervical MRI from July 2022 also from  has been reviewed with mild cervical spondylosis no significant central stenosis mild disc bulge at 5 6 and 6 7 with mild foraminal compromise.\par \par X-rays of the lumbar spine of been reviewed that shows an L2 compression fracture focal kyphosis osteolytic type appearance about the inferior posterior corner of L2. From  rad.\par \par MRI of the lumbar spine has been reviewed that shows an L2 compression fracture with abnormality that the radiologist is deeming possibly metastatic.\par \par X-rays performed on today's date of December 2, 2022 demonstrates compression and osseous abnormality of L2 there is also obliteration of the pedicles I believe consistent with an underlying pathologic process

## 2022-12-02 NOTE — DISCUSSION/SUMMARY
[de-identified] : Patient is going to be scheduled for an L2 percutaneous biopsy I believe conscious sedation may be an option for this procedure.  CAT scan of the lumbar spine is going to be temporarily held I would like to see his chest abdomen and pelvis CAT scan with the ability resuming on certain images I may be able to use that prior to sending this gentleman for a focal isolated lumbar CAT scan I am going to order a thoracic CAT scan to make sure there is no noncontiguous lesions.  He had a cervical MRI performed in July 2022 that shows no metastatic disease.  We have had a long conversation with regard to ultimate management which may require surgery if a metastatic lesion is ultimately diagnosed.  Patient is also been provided an LSO brace for comfort.

## 2022-12-02 NOTE — HISTORY OF PRESENT ILLNESS
[de-identified] : Patient presents for evaluation of low back pain MRIs possible metastatic lesion.  PMNR note appreciated patient has already been evaluated by an oncologist.  Oncologist has already ordered a chest abdomen pelvis CAT scan and blood work is also been ordered via the oncologist and his primary care physician.  He is touching base with us for assistance in coordination of care and ultimately a diagnosis.  Patient is lost 6 pounds since August.  This is unanticipated.  He states the medical doctor heard water on his lungs.  Pain is been worsening since August. [Worsening] : worsening [10] : a maximum pain level of 10/10 [Constant] : ~He/She~ states the symptoms seem to be constant [None] : No relieving factors are noted [Ataxia] : no ataxia [Incontinence] : no incontinence [Loss of Dexterity] : good dexterity [Urinary Ret.] : no urinary retention

## 2022-12-09 ENCOUNTER — APPOINTMENT (OUTPATIENT)
Dept: GASTROENTEROLOGY | Facility: CLINIC | Age: 52
End: 2022-12-09

## 2022-12-16 ENCOUNTER — APPOINTMENT (OUTPATIENT)
Dept: PHYSICAL MEDICINE AND REHAB | Facility: CLINIC | Age: 52
End: 2022-12-16

## 2023-01-03 ENCOUNTER — NON-APPOINTMENT (OUTPATIENT)
Age: 53
End: 2023-01-03

## 2023-01-03 ENCOUNTER — APPOINTMENT (OUTPATIENT)
Dept: ORTHOPEDIC SURGERY | Facility: CLINIC | Age: 53
End: 2023-01-03

## 2023-01-04 ENCOUNTER — APPOINTMENT (OUTPATIENT)
Dept: ORTHOPEDIC SURGERY | Facility: HOSPITAL | Age: 53
End: 2023-01-04

## 2023-01-13 ENCOUNTER — APPOINTMENT (OUTPATIENT)
Dept: ORTHOPEDIC SURGERY | Facility: CLINIC | Age: 53
End: 2023-01-13

## 2023-02-07 ENCOUNTER — APPOINTMENT (OUTPATIENT)
Dept: GASTROENTEROLOGY | Facility: AMBULATORY MEDICAL SERVICES | Age: 53
End: 2023-02-07

## 2024-09-30 ENCOUNTER — INPATIENT (INPATIENT)
Facility: HOSPITAL | Age: 54
LOS: 3 days | Discharge: HOME | DRG: 136 | End: 2024-10-04
Attending: INTERNAL MEDICINE | Admitting: INTERNAL MEDICINE
Payer: MEDICAID

## 2024-09-30 VITALS
HEIGHT: 67 IN | WEIGHT: 146.39 LBS | OXYGEN SATURATION: 98 % | RESPIRATION RATE: 17 BRPM | HEART RATE: 109 BPM | DIASTOLIC BLOOD PRESSURE: 92 MMHG | TEMPERATURE: 98 F | SYSTOLIC BLOOD PRESSURE: 155 MMHG

## 2024-09-30 LAB
ALBUMIN SERPL ELPH-MCNC: 3 G/DL — LOW (ref 3.3–5)
ALP SERPL-CCNC: 157 U/L — HIGH (ref 40–120)
ALT FLD-CCNC: 92 U/L — HIGH (ref 12–78)
ANION GAP SERPL CALC-SCNC: 9 MMOL/L — SIGNIFICANT CHANGE UP (ref 5–17)
APTT BLD: 26.2 SEC — SIGNIFICANT CHANGE UP (ref 24.5–35.6)
AST SERPL-CCNC: 106 U/L — HIGH (ref 15–37)
BASE EXCESS BLDV CALC-SCNC: -4.8 MMOL/L — LOW (ref -2–3)
BILIRUB SERPL-MCNC: 0.4 MG/DL — SIGNIFICANT CHANGE UP (ref 0.2–1.2)
BLD GP AB SCN SERPL QL: SIGNIFICANT CHANGE UP
BUN SERPL-MCNC: 20 MG/DL — SIGNIFICANT CHANGE UP (ref 7–23)
CALCIUM SERPL-MCNC: 8.5 MG/DL — SIGNIFICANT CHANGE UP (ref 8.5–10.1)
CHLORIDE SERPL-SCNC: 105 MMOL/L — SIGNIFICANT CHANGE UP (ref 96–108)
CO2 SERPL-SCNC: 21 MMOL/L — LOW (ref 22–31)
CREAT SERPL-MCNC: 1.41 MG/DL — HIGH (ref 0.5–1.3)
EGFR: 60 ML/MIN/1.73M2 — SIGNIFICANT CHANGE UP
GLUCOSE SERPL-MCNC: 134 MG/DL — HIGH (ref 70–99)
HCO3 BLDV-SCNC: 21 MMOL/L — LOW (ref 22–29)
HCT VFR BLD CALC: 24.8 % — LOW (ref 39–50)
HGB BLD-MCNC: 7.6 G/DL — LOW (ref 13–17)
INR BLD: 1.21 RATIO — HIGH (ref 0.85–1.16)
MCHC RBC-ENTMCNC: 30.6 GM/DL — LOW (ref 32–36)
MCHC RBC-ENTMCNC: 30.8 PG — SIGNIFICANT CHANGE UP (ref 27–34)
MCV RBC AUTO: 100.4 FL — HIGH (ref 80–100)
NT-PROBNP SERPL-SCNC: 4235 PG/ML — HIGH (ref 0–125)
PCO2 BLDV: 41 MMHG — LOW (ref 42–55)
PH BLDV: 7.32 — SIGNIFICANT CHANGE UP (ref 7.32–7.43)
PO2 BLDV: 38 MMHG — SIGNIFICANT CHANGE UP (ref 25–45)
POTASSIUM SERPL-MCNC: 4.4 MMOL/L — SIGNIFICANT CHANGE UP (ref 3.5–5.3)
POTASSIUM SERPL-SCNC: 4.4 MMOL/L — SIGNIFICANT CHANGE UP (ref 3.5–5.3)
PROT SERPL-MCNC: 7.2 GM/DL — SIGNIFICANT CHANGE UP (ref 6–8.3)
PROTHROM AB SERPL-ACNC: 13.9 SEC — HIGH (ref 9.9–13.4)
RBC # BLD: 2.47 M/UL — LOW (ref 4.2–5.8)
RBC # FLD: 16.1 % — HIGH (ref 10.3–14.5)
SAO2 % BLDV: 55 % — LOW (ref 67–88)
SODIUM SERPL-SCNC: 135 MMOL/L — SIGNIFICANT CHANGE UP (ref 135–145)
TROPONIN I, HIGH SENSITIVITY RESULT: 29.03 NG/L — SIGNIFICANT CHANGE UP
WBC # BLD: 4.14 K/UL — SIGNIFICANT CHANGE UP (ref 3.8–10.5)
WBC # FLD AUTO: 4.14 K/UL — SIGNIFICANT CHANGE UP (ref 3.8–10.5)

## 2024-09-30 PROCEDURE — 71045 X-RAY EXAM CHEST 1 VIEW: CPT | Mod: 26

## 2024-09-30 PROCEDURE — 71250 CT THORAX DX C-: CPT | Mod: 26,MC

## 2024-09-30 PROCEDURE — 93010 ELECTROCARDIOGRAM REPORT: CPT

## 2024-09-30 PROCEDURE — 99291 CRITICAL CARE FIRST HOUR: CPT

## 2024-09-30 RX ORDER — SODIUM CHLORIDE IRRIG SOLUTION 0.9 %
1000 SOLUTION, IRRIGATION IRRIGATION
Refills: 0 | Status: DISCONTINUED | OUTPATIENT
Start: 2024-09-30 | End: 2024-10-02

## 2024-09-30 NOTE — CONSULT NOTE ADULT - SUBJECTIVE AND OBJECTIVE BOX
ICU  Note    HPI:    S:    Pt seen and examined  55 y/o M  PMHx of chronic neck pain, HTN, H pylori ulcer, metastatic SCLC lung CA on chemo and radiation last treatment 3 weeks ago presents to the ED c/o chest pain and SOB x2 weeks and low hgb. Pt seen by JEANNA today, had CT which showed large pericardial effusion pressing on his R ventricle and L hydropneumothorax. Endorses decreased appetite and PO intake, JIMENEZ, and cough. Denies fevers. States he is on chemo every 4 weeks. Nonsmoker.    ICU and CTS consults requested    9/30: On RA. Not tachypneic able to speak in full sentences. Awaiting imaging.     ROS: + JIMENEZ  Remainder of systems reviewed, negative      Allergies    No Known Allergies    Intolerances    MEDICATIONS  (STANDING):    MEDICATIONS  (PRN):    Drug Dosing Weight    Height (cm): 170.2 (30 Sep 2024 19:50)  Weight (kg): 66.4 (30 Sep 2024 19:50)  BMI (kg/m2): 22.9 (30 Sep 2024 19:50)  BSA (m2): 1.77 (30 Sep 2024 19:50)    PAST MEDICAL & SURGICAL HISTORY:    H pylori ulcer  HTN (hypertension)  Chronic neck pain    FAMILY HISTORY:    REVIEW OF SYSTEMS    UNLESS OTHERWISE NOTED IN HPI above:    Constitutional:  No Weight Change, No Fever, No Chills, No Night Sweats, No Fatigue, No Malaise  ENT/Mouth:  No Hearing Changes, No Ear Pain, No Nasal Congestion, No  Sinus Pain, No Hoarseness, No sore throat, No Rhinorrhea, No Swallowing  Difficulty  Eyes:  No Eye Pain, No Swelling, No Redness, No Foreign Body, No Discharge, No Vision Changes  Cardiovascular:  No Chest Pain, No SOB, No PND, No Dyspnea on Exertion,  No Orthopnea, No Claudication, No Edema, No Palpitations  Respiratory:  No Cough, No Sputum, No Wheezing, No Smoke Exposure, No Dyspnea  Gastrointestinal:  No Nausea, No Vomiting, No Diarrhea, No  Constipation, No Pain, No Heartburn, No Anorexia, No Dysphagia, No  Hematochezia, No Melena, No Flatulence, No Jaundice  Genitourinary:  No Dysmenorrhea, No DUB, No Dyspareunia, No Dysuria, No  Urinary Frequency, No Hematuria, No Urinary Incontinence, No Urgency,  No Flank Pain, No Urinary Flow Changes, No Hesitancy  Musculoskeletal:  No Arthralgias, No Myalgias, No Joint Swelling, No  Joint Stiffness, No Back Pain, No Neck Pain, No Injury History  Skin:  No Skin Lesions, No Pruritis, No Hair Changes, No Breast/Skin Changes, No Nipple Discharge  Neuro:  No Weakness, No Numbness, No Paresthesias, No Loss of  Consciousness, No Syncope, No Dizziness, No Headache, No Coordination  Changes, No Recent Falls  Psych:  No Anxiety/Panic, No Depression, No Insomnia, No Personality  Changes, No Delusions, No Rumination, No SI/HI/AH/VH, No Social Issues,  No Memory Changes, No Violence/Abuse Hx., No Eating Concerns  Heme/Lymph:  No Bruising, No Bleeding, No Transfusions History, No Lymphadenopathy  Endocrine:  No Polyuria, No Polydipsia, No Temperature Intolerance    O:    ICU Vital Signs Last 24 Hrs  T(C): 36.5 (30 Sep 2024 19:50), Max: 36.5 (30 Sep 2024 19:50)  T(F): 97.7 (30 Sep 2024 19:50), Max: 97.7 (30 Sep 2024 19:50)  HR: 109 (30 Sep 2024 19:50) (109 - 109)  BP: 155/92 (30 Sep 2024 19:50) (155/92 - 155/92)  BP(mean): 111 (30 Sep 2024 19:50) (111 - 111)  ABP: --  ABP(mean): --  RR: 17 (30 Sep 2024 19:50) (17 - 17)  SpO2: 98% (30 Sep 2024 19:50) (98% - 98%)    O2 Parameters below as of 30 Sep 2024 19:50  Patient On (Oxygen Delivery Method): room air    I&O's Detail    PE:    Adult M lying in bed  No JVD trachea midline  S1S2+  Dec BS L side  Abd soft NTND  No LE edema/swelling noted  Awake and alert  Skin pink, warm    LABS:    PT/INR - ( 30 Sep 2024 22:24 )   PT: 13.9 sec;   INR: 1.21 ratio         PTT - ( 30 Sep 2024 22:24 )  PTT:26.2 sec    CAPILLARY BLOOD GLUCOSE

## 2024-09-30 NOTE — CONSULT NOTE ADULT - ASSESSMENT
A:    54M    Here for:    1. Pericardial effusion, and  2. Pleural effusion 2/2  3. Metastatic SCLC    This patient required a moderate lvl of support of  with a  probability of  deterioration in his/her condition    P/recommendations:    Pericardial effusion and L sided pleural effusions  These are 2/2 metastatic SCLC  [place] [facility] [place] for findings of hydroPTX L side with a pericardial effusion    Not in resp distress, not SOB in bed, but has JIMENEZ  Not hypoxic SpO2 100% on RA  Hemodynamics stable; not clinically in tamponade or particularly symptomatic from pleural effusion while at rest; suspect has accumulated over time    POCUS done; circumferential pericardial effusion, good ventricular squeeze, poor view of RV, no obvious buckling of septum    Plan:    CT chest  Labs  Hydrate    No urgent need for drainage this evening given clinical picture  Thoracic to follow  If becomes hemodynamically unstable call interventionalist cardiologist immediately for drainage    Dispo:  Admit. Imaging.     Date of entry of this note is equal to the date of services rendered    TOTAL CARE TIME: 55+ minutes of any non bundled procedures)    Note:  This time INCLUDES time spent directly as this patient's bedside with evaluation and management, review of chart including review of laboratory and imaging studies, interpretation of vital signs and cardiac output measurements, any necessary ventilator management, and time spent discussing plan of care with patient and family, including goals of care discussion. This also includes time spent in multidisciplinary discussion with care team and various consultants to optimize treatment plan. This time may NOT include various procedures, which will be noted separately

## 2024-09-30 NOTE — CONSULT NOTE ADULT - ASSESSMENT
A:    54M    Here for:    1. Pericardial effusion, and  2. Pleural effusion 2/2  3. Metastatic SCLC    This patient required a moderate lvl of support of  with a  probability of  deterioration in his/her condition    P/recommendations:    Pericardial effusion and L sided pleural effusions  These are 2/2 metastatic SCLC  [place] [facility] [place] in today for findings of hydroPTX L side with a pericardial effusion    Not in resp distress, not SOB in bed, but has JIMENEZ  Not hypoxic SpO2 100% on RA  Hemodynamics stable; not clinically in tamponade or particularly symptomatic from pleural effusion while at rest; suspect has accumulated over time    POCUS done; circumferential pericardial effusion, good ventricular squeeze, poor view of RV, no obvious buckling of septum    Plan:    CT chest  Labs  Hydrate    If evidence of RH strain on CT will admit to SDU  If no evidence of RH strain on CT medicine admit OK    No urgent need for drainage this evening given clinical picture; thoracic also consulted, see note    Dispo:  Admit, LOC TBD by imaging.     Date of entry of this note is equal to the date of services rendered    TOTAL CARE TIME: 55+ minutes of any non bundled procedures)    Note:  This time INCLUDES time spent directly as this patient's bedside with evaluation and management, review of chart including review of laboratory and imaging studies, interpretation of vital signs and cardiac output measurements, any necessary ventilator management, and time spent discussing plan of care with patient and family, including goals of care discussion. This also includes time spent in multidisciplinary discussion with care team and various consultants to optimize treatment plan. This time may NOT include various procedures, which will be noted seperately.

## 2024-09-30 NOTE — CONSULT NOTE ADULT - SUBJECTIVE AND OBJECTIVE BOX
HPI:    S:    Pt seen and examined  53 y/o M  PMHx of chronic neck pain, HTN, H pylori ulcer, metastatic SCLC lung CA on chemo and radiation last treatment 3 weeks ago presents to the ED c/o chest pain and SOB x2 weeks and low hgb. Pt seen by JEANNA today, had CT which showed large pericardial effusion pressing on his R ventricle and L hydropneumothorax. Endorses decreased appetite and PO intake, JIMENEZ, and cough. Denies fevers. States he is on chemo every 4 weeks. Nonsmoker.    ICU and CTS consults requested    9/30: On RA. Not tachypneic able to speak in full sentences. Awaiting imaging.     ROS: + JIMENEZ  Remainder of systems reviewed, negative      Allergies    No Known Allergies    Intolerances        MEDICATIONS  (STANDING):  lactated ringers. 1000 milliLiter(s) (125 mL/Hr) IV Continuous <Continuous>    MEDICATIONS  (PRN):      Drug Dosing Weight  Height (cm): 170.2 (30 Sep 2024 19:50)  Weight (kg): 66.4 (30 Sep 2024 19:50)  BMI (kg/m2): 22.9 (30 Sep 2024 19:50)  BSA (m2): 1.77 (30 Sep 2024 19:50)    PAST MEDICAL & SURGICAL HISTORY:  H pylori ulcer      HTN (hypertension)      Chronic neck pain          FAMILY HISTORY:          O:    ICU Vital Signs Last 24 Hrs  T(C): 36.5 (30 Sep 2024 19:50), Max: 36.5 (30 Sep 2024 19:50)  T(F): 97.7 (30 Sep 2024 19:50), Max: 97.7 (30 Sep 2024 19:50)  HR: 109 (30 Sep 2024 19:50) (109 - 109)  BP: 155/92 (30 Sep 2024 19:50) (155/92 - 155/92)  BP(mean): 111 (30 Sep 2024 19:50) (111 - 111)  ABP: --  ABP(mean): --  RR: 17 (30 Sep 2024 19:50) (17 - 17)  SpO2: 98% (30 Sep 2024 19:50) (98% - 98%)    O2 Parameters below as of 30 Sep 2024 19:50  Patient On (Oxygen Delivery Method): room air                I&O's Detail          PE:    Adult M lying in bed  No JVD trachea midline  S1S2+  Dec BS L side  Abd soft NTND  No LE edema/swelling noted  Awake and alert  Skin pink, warm    LABS:    CBC Full  -  ( 30 Sep 2024 22:24 )  WBC Count : 4.14 K/uL  RBC Count : 2.47 M/uL  Hemoglobin : 7.6 g/dL  Hematocrit : 24.8 %  Platelet Count - Automated : x  Mean Cell Volume : 100.4 fl  Mean Cell Hemoglobin : 30.8 pg  Mean Cell Hemoglobin Concentration : 30.6 gm/dL  Auto Neutrophil # : x  Auto Lymphocyte # : x  Auto Monocyte # : x  Auto Eosinophil # : x  Auto Basophil # : x  Auto Neutrophil % : x  Auto Lymphocyte % : x  Auto Monocyte % : x  Auto Eosinophil % : x  Auto Basophil % : x    09-30    135  |  105  |  20  ----------------------------<  134[H]  4.4   |  21[L]  |  1.41[H]    Ca    8.5      30 Sep 2024 22:24    TPro  7.2  /  Alb  3.0[L]  /  TBili  0.4  /  DBili  x   /  AST  106[H]  /  ALT  92[H]  /  AlkPhos  157[H]  09-30    PT/INR - ( 30 Sep 2024 22:24 )   PT: 13.9 sec;   INR: 1.21 ratio         PTT - ( 30 Sep 2024 22:24 )  PTT:26.2 sec  Urinalysis Basic - ( 30 Sep 2024 22:24 )    Color: x / Appearance: x / SG: x / pH: x  Gluc: 134 mg/dL / Ketone: x  / Bili: x / Urobili: x   Blood: x / Protein: x / Nitrite: x   Leuk Esterase: x / RBC: x / WBC x   Sq Epi: x / Non Sq Epi: x / Bacteria: x      CAPILLARY BLOOD GLUCOSE            LIVER FUNCTIONS - ( 30 Sep 2024 22:24 )  Alb: 3.0 g/dL / Pro: 7.2 gm/dL / ALK PHOS: 157 U/L / ALT: 92 U/L / AST: 106 U/L / GGT: x

## 2024-09-30 NOTE — ED ADULT TRIAGE NOTE - CHIEF COMPLAINT QUOTE
Ambulatory to ER with c/o chest pain; S.O.B x 3 weeks; hx of lung ca last tx x 3 weeks ago. Patient seen in [facility] today; CT showed large pericardial effusion pressing on R ventricle; L hydropneumothorax. sent to ER for further eval. Patient saturating 98% on room air in triage; EKG completed.

## 2024-09-30 NOTE — ED PROVIDER NOTE - SCRIBE NAME
Transplant Surgery Clinic Note     Patient ID: Flo Beck    Date: 1/24/2017    Reason for Visit: Consult HCC    HPI:   This is a very pleasant 70 year-old male who presents to the clinic today for consult regarding HCC.     Patient has a history of well-compensated JIM/VILLEDA Cirrhosis with MELD 7 (04/2016) who was diagnosed with HCC by imaging in 12/2015. Patient has now undergone TACE 02/2016 and 04/2016. His most recent CT Abdomen demonstrated no residual disease. Patient’s original AFP from 01/2016 was normal and has not been re-checked.     Patient’s additional past medical history is significant for CAD s/p CABG X 2, HTN, hyperlipidemia, and obesity. Patient follows with Dr. Winn on a regular basis. ECHO from 06/2014 demonstrates EF 65%.     Patient has significant sensorineural hearing loss and has hearing aids.     Patient presents to the clinic today feeling well. He conitnues to drink about 2-4 drinks per day (mixture of cocktails/beer/wine). Patient states he doesn't always count. It does appear that patient has been told to stop drinking EtOH in the past. He denies any issues with GIB, hepatic encephalopathy, or ascites.     Past Medical History:   Past Medical History   Diagnosis Date   • Acute recurrent pancreatitis      x 4 admissions [id number]   • Alcohol-induced chronic pancreatitis    • Anxiety    • Arthritis    • Back pain      Chronic   • Cool esophagus    • BPH (benign prostatic hypertrophy)    • CAD (coronary artery disease) 1996     heart attack    • Cardiac failure congestive 2005   • Chronic diarrhea    • Dyslipidemia    • Esophageal reflux    • Fracture 07/01/2016     Right Fibula    • Gout    • Hiatal Hernia    • High cholesterol    • History of diverticulitis of colon    • Hypertension    • Insomnia    • Neuropathy      Lef leg, numbness   • Pancreatic cyst    • Trigeminal neuralgia      Right ear & jaw, controlled with Tegretol   • Uses hearing aid      Bilateral    • Wears  glasses        Medications:  Current Outpatient Prescriptions   Medication Sig   • ALPRAZolam (XANAX) 0.5 MG tablet Take 1 tablet by mouth 3 times daily as needed for Sleep or Anxiety.   • acetaminophen-codeine (TYLENOL NO.3) 300-30 MG per tablet Do not exceed 3000 mg of Acetaminophen in 24 hours.Do not exceed 3000 mg of Acetaminophen in 24 hours. Take 1-2 tab po [id number] PRN pain   • carBAMazepine (TEGRETOL XR) 200 MG 12 hr tablet TAKE 1 TABLET THREE TIMES A DAY   • fenofibrate (TRICOR) 145 MG tablet TAKE 1 TABLET DAILY   • Lidocaine, Anorectal, 5 % Cream Apply to anal area 3-4 times per day as needed   • atenolol (TENORMIN) 50 MG tablet TAKE 1 TABLET DAILY   • biotin 1000 MCG tablet Take 1,000 mcg by mouth daily.   • Vitamin D, Ergocalciferol, 83039 UNITS capsule Take 50,000 Units by mouth every 30 days.   • atorvastatin (LIPITOR) 40 MG tablet TAKE ONE AND ONE-HALF TABLETS DAILY   • tamsulosin (FLOMAX) 0.4 MG Cap Take 1 capsule by mouth daily.   • DISPENSE Knee high compression stockings 20-30 mmHg Dx: Right lower extremity edema   • Cholecalciferol (VITAMIN D3) 26415 UNITS Cap Take 50,000 Units by mouth every 30 days.   • esomeprazole (NEXIUM) 40 MG capsule Take 1 capsule by mouth 2 times daily. (Patient taking differently: Take 40 mg by mouth daily (before breakfast). )   • HYDROCORT 2.5%/LIDO 5%/NIFED 0.2% RECTAL OINTMENT Apply to anal area 1-3 times daily. Hydrocortisone 2.5%, Lidocaine 5%, Nifedipine 0.2%   • clobetasol (TEMOVATE) 0.05 % Gel Apply to nail folds BID   • furosemide (LASIX) 20 MG tablet Take 1 tablet by mouth daily. Ankle swelling (Patient taking differently: Take 20 mg by mouth as needed. Ankle swelling)   • Aspirin 81 MG OR TABS 1 TABLET DAILY   • colchicine (COLCRYS) 0.6 MG tablet Take 1 tablet by mouth every 3 hours. Indications: Acute Joint Inflammation in Gout Take 2 tablets initially. Stop if diarrhea develops. (Patient taking differently: Take 0.6 mg by mouth as needed. Indications: Acute  Joint Inflammation in Gout Take 2 tablets initially. Stop if diarrhea develops.)     No current facility-administered medications for this visit.        Allergies:  ALLERGIES:   Allergen Reactions   • Adhesive RASH     Ekg patches   • Altace Other (See Comments)     Elevated Creatinine level, passed out when given   • Lyrica [Pregabalin] HIVES and ANAPHYLAXIS     Facial swelling   • Penicillins RASH   • Thimerosal RASH and SWELLING     Preservative in vaccines   • Ciprofloxacin HIVES     IV Cipro given and pt had a localized reaction in arm with hives, redness, swelling.        Social History:  Patient is currently , retired. Drinks EtOh daily (2-4 drinks of various sorts).     Surgical History:  Past Surgical History   Procedure Laterality Date   • Rampart tooth extraction     • Inguinal hernia repair  2002     Left   • Knee arthroscopy w/ meniscal repair  2004     Right   • Rotator cuff repair  5/2005     Right   • Cyst removal  2008     Neck   • Ercp w/ plastic stent placement  [id number]     Multiple for pancreatitis   • Gi endoscopic ultrasound  [id number]     Multiple, last procedure 9/13/2013   • Colonoscopy  08/19/2011     Diverticulosis;repeat 10 yrs;08/2021, 5 prior colonoscopies   • Coronary angiogram - cv  [id number]     Several, last procedure 5/2010. RCA occluded, other vessels/graft free of diesease   • Cardiac surgery       Coronary Artery Bypass LAD   • Removal gallbladder       Lap Amrita   • Laminectomy,lumbar  05-     L4-S1   • Ir chemoembolization (tace)  2/10/16   • Hemorrhoidectomy int ext more column group  11/10/2016     with fissurectomy, Dr. Lagunas   • Fracture surgery  07/05/2016     Right Ankle Surgery       Family History:   Family History   Problem Relation Age of Onset   • Cancer Father 70     throat   • Mental illness Mother    • Vision loss Mother    • Heart disease Mother    • Arthritis Other    • Dementia Other    • Heart disease Other        ROS, negative  except as noted per HPI:      Physical Exam:  Vitals:    17 1112   BP: 138/84   Pulse: 78         Data reviewed:  Lab:  MELD-Na score: 7 at 2016  7:13 AM  MELD score: 7 at 2016  7:13 AM  Calculated from:  Serum Creatinine: 1.00 mg/dL at 2016  7:13 AM  Serum Sodium: 137 mmol/L at 2016  7:13 AM  Total Bilirubin: 0.7 mg/dL at 2016  7:13 AM  INR(ratio): 1.1 at 2016  7:13 AM  Age: 70 years     Imagin16 CT Abdomen  IMPRESSION:  1. Postprocedural changes status post transarterial chemoembolization to  the segment VI hepatocellular carcinoma. The lesion continues to decrease  in size and shows no evidence of residual or recurrent enhancement (LIRADS  5T/OPTN5T; No residual tumor). No new lesions are identified.  2. Multiple additional stable findings, as detailed above.    Assessment/Plan:  1. VILLEDA vs. JIM Cirrhosis with HCC.    -- Last MELD 2016 and was 7. Patient appears to be well-compensated.    -- Continues to drink EtOH daily, anywhere between 2-4 drinks of various sorts.    -- Advised to completely stop EtOh completely.    -- S/P TACE x 2 2016 and 2016 with excellent response.    -- 16 CT demonstrating no residual treatment.    -- At this point, consider patient for liver transplant evaluation.    -- Repeat MELD labs today.    -- Follow-up in 2-3 weeks. Hepatology visit upon return.   2. H/O CAD s/p CABG x 2 in . Prior AMI wtihout intervention in . Follows with Dr. Winn, 2014 ECHO with EF 65%.   3. Obesity. BMI 37.   4. HLD.   5. HTN.   6. Vitamin D deficiency. Will add Vitamin D level to labs per patient.     This is Lindsay Sams PA-C personally performing the history & exam and jointly creating medical decision making with Roberth Pacheco MD.   _______________________  Roberth Pacheco M.D.  Transplant Surgeon  Abdominal Transplant Program  [facility] [place]       Katarzyna Gill

## 2024-09-30 NOTE — ED ADULT TRIAGE NOTE - GLASGOW COMA SCALE: BEST VERBAL RESPONSE, MLM
(V5) oriented Duration Of Freeze Thaw-Cycle (Seconds): 3 Post-Care Instructions: I reviewed with the patient in detail post-care instructions. Patient is to wear sunprotection, and avoid picking at any of the treated lesions. Pt may apply Vaseline to crusted or scabbing areas. Detail Level: Zone Number Of Freeze-Thaw Cycles: 3 freeze-thaw cycles Render Post-Care Instructions In Note?: no Show Aperture Variable?: Yes Consent: The patient's consent was obtained including but not limited to risks of crusting, scabbing, blistering, scarring, darker or lighter pigmentary change, recurrence, incomplete removal and infection.

## 2024-09-30 NOTE — ED PROVIDER NOTE - PHYSICAL EXAMINATION
Constitutional: NAD, alert, verbal  HEENT: NCAT, EOMi, PERRL  Cardiac: RRR no MRG  Resp: clear, no wheezing or crackles  GI: ab soft ntnd, no r/g  Ext: no edema  Neuro: A&Ox3, HILL  Skin: No rashes

## 2024-09-30 NOTE — ED PROVIDER NOTE - OBJECTIVE STATEMENT
53 y/o M with PMHx of chronic neck pain, HTN, H pylori ulcer, lung CA on chemo and radiation last treatment 3 weeks ago presents to the ED c/o chest pain and SOB x2 weeks and low hgb. Pt seen by [facility] today, had CT which showed large pericardial effusion pressing on his R ventricle and L hydropneumothorax. Endorses decreased appetite and PO intake, JIMENEZ, and cough. Denies fevers. States he is on chemo every 4 weeks. Nonsmoker.

## 2024-09-30 NOTE — ED ADULT TRIAGE NOTE - HEIGHT IN FEET
ANTICOAGULATION FOLLOW-UP CLINIC VISIT    Patient Name:  Haresh Rock  Date:  2019  Contact Type:  Face to Face    SUBJECTIVE:  Patient Findings         Clinical Outcomes     Negatives:   Major bleeding event, Thromboembolic event, Anticoagulation-related hospital admission, Anticoagulation-related ED visit, Anticoagulation-related fatality           OBJECTIVE    INR Protime   Date Value Ref Range Status   2019 2.7 (A) 0.86 - 1.14 Final     Factor 2 Assay   Date Value Ref Range Status   2007 58 (L) 60 - 140 % Final     Comment:     (Note)  CALLED TO TAMARA(INTERV. RADIOLOGY)[id number],        ASSESSMENT / PLAN  No question data found.  Anticoagulation Summary  As of 2019    INR goal:   2.0-3.0   TTR:   80.1 % (3.2 y)   INR used for dosin.7 (2019)   Warfarin maintenance plan:   1.25 mg (2.5 mg x 0.5) every Mon, Fri; 2.5 mg (2.5 mg x 1) all other days   Full warfarin instructions:   1.25 mg every Mon, Fri; 2.5 mg all other days   Weekly warfarin total:   15 mg   No change documented:   Laverne Ferguson RN   Plan last modified:   Laverne Ferguson RN (2018)   Next INR check:   2019   Priority:   INR   Target end date:   Indefinite    Indications    Long-term (current) use of anticoagulants [Z79.01] [Z79.01]  Atrial fibrillation (H) [I48.91]             Anticoagulation Episode Summary     INR check location:       Preferred lab:       Send INR reminders to:   CHELSEA CAMACHO    Comments:         Anticoagulation Care Providers     Provider Role Specialty Phone number    Anya Nowak MD [facility] [phone]            See the Encounter Report to view Anticoagulation Flowsheet and Dosing Calendar (Go to Encounters tab in chart review, and find the Anticoagulation Therapy Visit)        Laverne Ferguson RN                 
5

## 2024-09-30 NOTE — ED PROVIDER NOTE - CLINICAL SUMMARY MEDICAL DECISION MAKING FREE TEXT BOX
55 y/o M with PMHx of chronic neck pain, HTN, H pylori ulcer, lung CA on chemo and radiation last treatment 3 weeks ago presents to the ED c/o chest pain and SOB x2 weeks and low hgb. Pt seen by [facility] today, had CT which showed large pericardial effusion pressing on his R ventricle and L hydropneumothorax. Endorses decreased appetite and PO intake, JIMENEZ, and cough. Plan on labs, CXR, EKG, Cardiothoracic and ICU consults. 53 y/o M with PMHx of chronic neck pain, HTN, H pylori ulcer, lung CA on chemo and radiation last treatment 3 weeks ago presents to the ED c/o chest pain and SOB x2 weeks found to have a large pericardial effusion and left hydropneumothorax. HDS. no resp distress. pocus shows large effusion.  Plan on labs, CXR, EKG, Cardiothoracic and ICU consults. reasssess

## 2024-09-30 NOTE — ED ADULT TRIAGE NOTE - NS ED TRIAGE EKG
EKG completed Humira Counseling:  I discussed with the patient the risks of adalimumab including but not limited to myelosuppression, immunosuppression, autoimmune hepatitis, demyelinating diseases, lymphoma, and serious infections.  The patient understands that monitoring is required including a PPD at baseline and must alert us or the primary physician if symptoms of infection or other concerning signs are noted.

## 2024-10-01 ENCOUNTER — RESULT REVIEW (OUTPATIENT)
Age: 54
End: 2024-10-01

## 2024-10-01 DIAGNOSIS — I30.9 ACUTE PERICARDITIS, UNSPECIFIED: ICD-10-CM

## 2024-10-01 DIAGNOSIS — I50.21 ACUTE SYSTOLIC (CONGESTIVE) HEART FAILURE: ICD-10-CM

## 2024-10-01 DIAGNOSIS — R06.02 SHORTNESS OF BREATH: ICD-10-CM

## 2024-10-01 LAB
ABO RH CONFIRMATION: SIGNIFICANT CHANGE UP
BASOPHILS # BLD AUTO: 0 K/UL — SIGNIFICANT CHANGE UP (ref 0–0.2)
BASOPHILS NFR BLD AUTO: 0 % — SIGNIFICANT CHANGE UP (ref 0–2)
EOSINOPHIL # BLD AUTO: 0 K/UL — SIGNIFICANT CHANGE UP (ref 0–0.5)
EOSINOPHIL NFR BLD AUTO: 0 % — SIGNIFICANT CHANGE UP (ref 0–6)
GLUCOSE FLD-MCNC: 89 MG/DL — SIGNIFICANT CHANGE UP
GRAM STN FLD: SIGNIFICANT CHANGE UP
LDH SERPL L TO P-CCNC: 877 U/L — SIGNIFICANT CHANGE UP
LYMPHOCYTES # BLD AUTO: 0.54 K/UL — LOW (ref 1–3.3)
LYMPHOCYTES # BLD AUTO: 13 % — SIGNIFICANT CHANGE UP (ref 13–44)
MACROCYTES BLD QL: SLIGHT — SIGNIFICANT CHANGE UP
MANUAL SMEAR VERIFICATION: SIGNIFICANT CHANGE UP
MONOCYTES # BLD AUTO: 0.41 K/UL — SIGNIFICANT CHANGE UP (ref 0–0.9)
MONOCYTES NFR BLD AUTO: 10 % — SIGNIFICANT CHANGE UP (ref 2–14)
MRSA PCR RESULT.: SIGNIFICANT CHANGE UP
NEUTROPHILS # BLD AUTO: 3.19 K/UL — SIGNIFICANT CHANGE UP (ref 1.8–7.4)
NEUTROPHILS NFR BLD AUTO: 76 % — SIGNIFICANT CHANGE UP (ref 43–77)
NEUTS BAND # BLD: 1 % — SIGNIFICANT CHANGE UP (ref 0–8)
NRBC # BLD: 1 /100 WBCS — HIGH (ref 0–0)
NRBC # BLD: SIGNIFICANT CHANGE UP /100 WBCS (ref 0–0)
PLAT MORPH BLD: NORMAL — SIGNIFICANT CHANGE UP
PLATELET # BLD AUTO: 31 K/UL — LOW (ref 150–400)
POLYCHROMASIA BLD QL SMEAR: SLIGHT — SIGNIFICANT CHANGE UP
PROT FLD-MCNC: 4.7 G/DL — SIGNIFICANT CHANGE UP
RBC BLD AUTO: ABNORMAL
S AUREUS DNA NOSE QL NAA+PROBE: SIGNIFICANT CHANGE UP
SCHISTOCYTES BLD QL AUTO: SLIGHT — SIGNIFICANT CHANGE UP
SPECIMEN SOURCE: SIGNIFICANT CHANGE UP

## 2024-10-01 PROCEDURE — 84100 ASSAY OF PHOSPHORUS: CPT

## 2024-10-01 PROCEDURE — 93321 DOPPLER ECHO F-UP/LMTD STD: CPT

## 2024-10-01 PROCEDURE — 36415 COLL VENOUS BLD VENIPUNCTURE: CPT

## 2024-10-01 PROCEDURE — 82945 GLUCOSE OTHER FLUID: CPT

## 2024-10-01 PROCEDURE — 93458 L HRT ARTERY/VENTRICLE ANGIO: CPT

## 2024-10-01 PROCEDURE — 87206 SMEAR FLUORESCENT/ACID STAI: CPT

## 2024-10-01 PROCEDURE — C1887: CPT

## 2024-10-01 PROCEDURE — 85027 COMPLETE CBC AUTOMATED: CPT

## 2024-10-01 PROCEDURE — 87102 FUNGUS ISOLATION CULTURE: CPT

## 2024-10-01 PROCEDURE — 83735 ASSAY OF MAGNESIUM: CPT

## 2024-10-01 PROCEDURE — 36430 TRANSFUSION BLD/BLD COMPNT: CPT

## 2024-10-01 PROCEDURE — 87252 VIRUS INOCULATION TISSUE: CPT

## 2024-10-01 PROCEDURE — P9100: CPT

## 2024-10-01 PROCEDURE — 89051 BODY FLUID CELL COUNT: CPT

## 2024-10-01 PROCEDURE — 33016 PERICARDIOCENTESIS W/IMAGING: CPT

## 2024-10-01 PROCEDURE — 80048 BASIC METABOLIC PNL TOTAL CA: CPT

## 2024-10-01 PROCEDURE — 99152 MOD SED SAME PHYS/QHP 5/>YRS: CPT

## 2024-10-01 PROCEDURE — 88108 CYTOPATH CONCENTRATE TECH: CPT

## 2024-10-01 PROCEDURE — 88305 TISSUE EXAM BY PATHOLOGIST: CPT | Mod: 26

## 2024-10-01 PROCEDURE — 93308 TTE F-UP OR LMTD: CPT

## 2024-10-01 PROCEDURE — 87075 CULTR BACTERIA EXCEPT BLOOD: CPT

## 2024-10-01 PROCEDURE — 87641 MR-STAPH DNA AMP PROBE: CPT

## 2024-10-01 PROCEDURE — 80053 COMPREHEN METABOLIC PANEL: CPT

## 2024-10-01 PROCEDURE — C1769: CPT

## 2024-10-01 PROCEDURE — 85025 COMPLETE CBC W/AUTO DIFF WBC: CPT

## 2024-10-01 PROCEDURE — 88108 CYTOPATH CONCENTRATE TECH: CPT | Mod: 26,59

## 2024-10-01 PROCEDURE — 99291 CRITICAL CARE FIRST HOUR: CPT

## 2024-10-01 PROCEDURE — P9037: CPT

## 2024-10-01 PROCEDURE — C1894: CPT

## 2024-10-01 PROCEDURE — 93308 TTE F-UP OR LMTD: CPT | Mod: 26,59

## 2024-10-01 PROCEDURE — 83615 LACTATE (LD) (LDH) ENZYME: CPT

## 2024-10-01 PROCEDURE — 87116 MYCOBACTERIA CULTURE: CPT

## 2024-10-01 PROCEDURE — 99223 1ST HOSP IP/OBS HIGH 75: CPT | Mod: 57,25

## 2024-10-01 PROCEDURE — 84157 ASSAY OF PROTEIN OTHER: CPT

## 2024-10-01 PROCEDURE — 87640 STAPH A DNA AMP PROBE: CPT

## 2024-10-01 PROCEDURE — 99223 1ST HOSP IP/OBS HIGH 75: CPT

## 2024-10-01 PROCEDURE — 87070 CULTURE OTHR SPECIMN AEROBIC: CPT

## 2024-10-01 PROCEDURE — 93306 TTE W/DOPPLER COMPLETE: CPT | Mod: 26

## 2024-10-01 PROCEDURE — 88305 TISSUE EXAM BY PATHOLOGIST: CPT

## 2024-10-01 PROCEDURE — 87015 SPECIMEN INFECT AGNT CONCNTJ: CPT

## 2024-10-01 PROCEDURE — 88108 CYTOPATH CONCENTRATE TECH: CPT | Mod: 26

## 2024-10-01 RX ORDER — INFLUENZA VIRUS VACCINE 15; 15; 15; 15 UG/.5ML; UG/.5ML; UG/.5ML; UG/.5ML
0.5 SUSPENSION INTRAMUSCULAR ONCE
Refills: 0 | Status: DISCONTINUED | OUTPATIENT
Start: 2024-10-01 | End: 2024-10-04

## 2024-10-01 RX ADMIN — Medication 125 MILLILITER(S): at 00:14

## 2024-10-01 NOTE — CONSULT NOTE ADULT - SUBJECTIVE AND OBJECTIVE BOX
CHIEF COMPLAINT: shortness of breath     HPI: 54 year old male with hx metastatic lung carcinoma  on chemotherapy for last 2 years prior radiation to spine /brain ,   another round of radiation to chest few weeks ago , came to hospital with complain that his SOB is not getting better after receiving chemotherapy , as he usually feels better after 2 weeks of chemotherapy , came to ER noted to have large pericardial effusion , early tamponade , has severe LV dysfunction ,  patient is tachycardic , called for cardiology consult , patient  denies any prior hx of  CAD  , HTN HLD         PAST MEDICAL & SURGICAL HISTORY:  H pylori ulcer      HTN (hypertension)      Chronic neck pain          Allergies    No Known Allergies    Intolerances        SOCIAL HISTORY:  non smoker   no alcohol       FAMILY HISTORY:    mother breast , uterine carcinoma   father stroke     MEDICATIONS:  MEDICATIONS  (STANDING):  influenza   Vaccine 0.5 milliLiter(s) IntraMuscular once  lactated ringers. 1000 milliLiter(s) (125 mL/Hr) IV Continuous <Continuous>    MEDICATIONS  (PRN):      REVIEW OF SYSTEMS:    CONSTITUTIONAL: No weakness, fevers or chills  EYES/ENT: No visual changes;  No vertigo or throat pain   NECK: No pain or stiffness  RESPIRATORY: No cough, wheezing, hemoptysis; + shortness of breath  CARDIOVASCULAR: when he coughs  chest pain   GASTROINTESTINAL: No abdominal or epigastric pain. No nausea, vomiting, or hematemesis; No diarrhea or constipation. No melena or hematochezia.  GENITOURINARY: No dysuria, frequency or hematuria  NEUROLOGICAL: No numbness or weakness  SKIN: No itching, burning, rashes, or lesions   All other review of systems is negative unless indicated above    Vital Signs Last 24 Hrs  T(C): 36.9 (01 Oct 2024 10:28), Max: 36.9 (01 Oct 2024 10:28)  T(F): 98.5 (01 Oct 2024 10:28), Max: 98.5 (01 Oct 2024 10:28)  HR: 103 (01 Oct 2024 10:28) (94 - 109)  BP: 143/93 (01 Oct 2024 10:28) (133/92 - 155/92)  BP(mean): 108 (01 Oct 2024 10:28) (105 - 116)  RR: 31 (01 Oct 2024 10:28) (17 - 31)  SpO2: 100% (01 Oct 2024 10:28) (98% - 100%)    Parameters below as of 01 Oct 2024 06:00  Patient On (Oxygen Delivery Method): room air        I&O's Summary    30 Sep 2024 07:01  -  01 Oct 2024 07:00  --------------------------------------------------------  IN: 125 mL / OUT: 0 mL / NET: 125 mL        PHYSICAL EXAM:    Constitutional: NAD, awake and alert, well-developed  HEENT: PERR, EOMI,  No oral cyananosis.  Neck:  supple,  No JVD  Respiratory: Breath sounds are clear bilaterally, No wheezing, rales or rhonchi  Cardiovascular: S1 and S2, regular rate and rhythm, no Murmurs, gallops or rubs  Gastrointestinal: Bowel Sounds present, soft, nontender.   Extremities: No peripheral edema. No clubbing or cyanosis.  Vascular: 2+ peripheral pulses  Neurological: A/O x 3, no focal deficits  Musculoskeletal: no calf tenderness.  Skin: generalized pigmentation     LABS: All Labs Reviewed:                        7.6    4.14  )-----------( 31       ( 30 Sep 2024 22:24 )             24.8     30 Sep 2024 22:24    135    |  105    |  20     ----------------------------<  134    4.4     |  21     |  1.41     Ca    8.5        30 Sep 2024 22:24    TPro  7.2    /  Alb  3.0    /  TBili  0.4    /  DBili  x      /  AST  106    /  ALT  92     /  AlkPhos  157    30 Sep 2024 22:24    PT/INR - ( 30 Sep 2024 22:24 )   PT: 13.9 sec;   INR: 1.21 ratio         PTT - ( 30 Sep 2024 22:24 )  PTT:26.2 sec        Blood Culture:         RADIOLOGY/EKG:< from: TTE W or WO Ultrasound Enhancing Agent (10.01.24 @ 07:43) >      1. Left ventricular systolic function is severely decreased with an ejection fraction of 20 %.   2. Small right ventricular cavity size and normal right ventricular systolic function.   3. There is a large pericardial effusion with with early tamponade physiology. This was supported by evidence of, greater than 30% respiratory variation across the mitral valve E wave and diastolic collapse of the right atrium that exceeds 1/3 of the cardiac cycle. The pericardium appears markedly thickened.    ______________EKG sinus tachycardiac low qrs voltage T inversion V1 v2 ________________________________________________    < end of copied text >  < from: CT Chest No Cont (09.30.24 @ 23:26) >  MPRESSION:  Evaluation somewhat limited in the absence of prior outside imaging for   comparison.    Large pericardial effusion with extrinsic compression of the right   ventricle/atrium.    Small right pleural effusion. Small left anterior medial upper lobe   hydropneumothorax    Left basilar consolidation, may be related to the patient's known   malignancy, however in the absence of outside imaging for comparison,   infection is also a consideration in the appropriate clinical context.    Left-sided pleural plaques, may be related to the patient's known   malignancy and/or radiation.    < end of copied text >

## 2024-10-01 NOTE — H&P ADULT - ASSESSMENT
A/P: 54 male with Met SSLCA who is presenting with a symptomatic pericardial effusion    Plan:   CCU    Pulm: Small Effusion and a tiny anterior and superior hydro pneum.  Will need to be followed.  Not lokely Pneumonia.  WBC normal    Cardio: For Drainage of the effusion today, will need a repeat ECHO post drainage, herminio need a W/U for low EF    Renal: Continue LR at 125    GI: NPO    Venodynes

## 2024-10-01 NOTE — DIETITIAN INITIAL EVALUATION ADULT - PERTINENT MEDS FT
MEDICATIONS  (STANDING):  influenza   Vaccine 0.5 milliLiter(s) IntraMuscular once  lactated ringers. 1000 milliLiter(s) (125 mL/Hr) IV Continuous <Continuous>

## 2024-10-01 NOTE — CONSULT NOTE ADULT - PROBLEM SELECTOR RECOMMENDATION 2
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 adding motrin if no evidence of malignant cells in cytology

## 2024-10-01 NOTE — CONSULT NOTE ADULT - PROBLEM SELECTOR RECOMMENDATION 3
cardiomyopathy possible ? chemotherapy ,  radiation related  : will add low dose BB , entresto  once pericardiocentesis is done , with low dose diuretic

## 2024-10-01 NOTE — CONSULT NOTE ADULT - ASSESSMENT
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 Pt seen by [facility] and had CT which showed large pericardial effusion pressing on his R ventricle and small L hydropneumothorax. In ER pt. was noted to have a large pericardial effusion, early tamponade. Prior to procedure patient received 1 bag of platelets for platelet count of 31  S/P pericardiocentesis 600cc      fluid drained   Specimen sent for culture and cell count     -Continue CCU monitoring  -Monitor H&H   -Follow-up culture results     Thank you for allowing us to consult this patient 55 y/o Male with a PMH of chronic neck pain, HTN, H pylori ulcer, metastatic SCLC lung CA on chemo and radiation last treatment 3 weeks ago presents to the ED c/o chest pain and SOB x2 weeks and low hgb. Pt seen by [facility] and had CT which showed large pericardial effusion pressing on his R ventricle and small L hydropneumothorax. In ER pt. was noted to have a large pericardial effusion, early tamponade. Prior to procedure patient to receive 1 bag of platelets for platelet count of 31    -Pt. to have pericardiocentesis today   Thank you for allowing us to consult this patient

## 2024-10-01 NOTE — DIETITIAN INITIAL EVALUATION ADULT - ORAL INTAKE PTA/DIET HISTORY
Reports very poor appetite/ intake over the last 3 weeks 2/2 nausea and vomiting every time he has tried to eat. Per diet recall from pt and wife, pt has been consuming <50% of ENN over the last 3 weeks. Does not follow any diet restrictions and has been trying to drink one ensure daily despite not liking it over the last 3 weeks. Pt lives w/ wife who does the cooking/ shopping.

## 2024-10-01 NOTE — PROGRESS NOTE ADULT - SUBJECTIVE AND OBJECTIVE BOX
Nurse Practitioner Progress note:     HPI:  53 y/o Male with a PMH of chronic neck pain, HTN, H pylori ulcer, metastatic SCLC lung CA on chemo and radiation last treatment 3 weeks ago presents to the ED c/o chest pain and SOB x2 weeks and low hgb. Pt seen by [facility] and had CT which showed large pericardial effusion pressing on his R ventricle and small L hydropneumothorax. Endorses decreased appetite and PO intake, JIMENEZ, and cough. Denies fevers. States he is on chemo every 4 weeks. Nonsmoker.    10/1: Patient seen, no CP, EF now 25%, and going with interventional cardio for drainage  (01 Oct 2024 11:29)    Pt brought to cath lab for pericardiocentesis   S/P  600cc's straw colored fluid drained from pt.          T(C): 37.1 (10-01-24 @ 13:55), Max: 37.1 (10-01-24 @ 13:49)  HR: 114 (10-01-24 @ 13:55) (94 - 114)  BP: 133/98 (10-01-24 @ 13:55) (117/55 - 155/92)  RR: 18 (10-01-24 @ 13:55) (17 - 31)  SpO2: 100% (10-01-24 @ 13:55) (98% - 100%)  Wt(kg): --        PHYSICAL EXAM:  NEURO: Non-focal, AxOx3.  No neuro deficits NECK: Supple, No JVD, No LAD  CHEST/LUNG: Clear to auscultation bilaterally; No wheeze  HEART:IRRR  ABDOMEN: Soft, Nontender, Nondistended; Bowel sounds present X 4 quadrants   EXTREMITIES:  2+ Peripheral Pulses, No clubbing, cyanosis, or edema   VASCULAR: Peripheral pulses palpable 2+ bilaterally  PROCEDURE SITE:  LCW with drain sutured, drain to gravity.       PROCEDURE RESULTS: Full report to follow     ASSESSMENT: HPI:  53 y/o Male with a PMH of chronic neck pain, HTN, H pylori ulcer, metastatic SCLC lung CA on chemo and radiation last treatment 3 weeks ago presents to the ED c/o chest pain and SOB x2 weeks and low hgb. Pt seen by [facility] and had CT which showed large pericardial effusion pressing on his R ventricle and small L hydropneumothorax. Endorses decreased appetite and PO intake, JIMENEZ, and cough. Denies fevers. States he is on chemo every 4 weeks. Nonsmoker.    10/1: Patient seen, no CP, EF now 25%, and going with interventional cardio for drainage  (01 Oct 2024 11:29)      S/P Pericardiocentesis     PLAN:  -Return to CCU  -Drain to gravity  -Limited TTE in AM  -Re evaluate in am for possible removal   -Monitor closely for decompensation.   -Plan discussed with patient, family, Dr Rudolph, Dr Lopez and RN.   -Remainder of care to be addressed by primary team.

## 2024-10-01 NOTE — ED ADULT NURSE NOTE - OBJECTIVE STATEMENT
53yo Male SIB MD at [facility] [place] co chest pain and SOB x2 weeks and low hgb. Pt seen by [facility] today and had CT which showed a large pericardial effusion pressing on his R ventricle and L hydropneumothorax. Pt endorsing dec PO intake and appetite, JIMENEZ, and cough. Pt denies fevers. PMHx stage 4 lung cancer and on chemo every four weeks. AOx4, ambulatory. Pt placed on cardiac monitor upon arrival to room, EKG completed in triage, Pt noted to have IV placed in R forearm by [facility] and states "The doctor put in the IV in case [facility] wants to draw labs."

## 2024-10-01 NOTE — ED ADULT NURSE REASSESSMENT NOTE - NS ED NURSE REASSESS COMMENT FT1
report received from Maynor NAVARRO. pt contacted made, pt resting in bed comfortably, A&Ox4, VSS at this time. Awaiting admission bed, no other verbal complaints at this time

## 2024-10-01 NOTE — BRIEF OPERATIVE NOTE - COMMENTS
610 cc's of straw colored, sediment fluid removed from the pericardium  Specimens sent to lab for evaluation.

## 2024-10-01 NOTE — CONSULT NOTE ADULT - SUBJECTIVE AND OBJECTIVE BOX
HPI:  55 y/o Male with a PMH of metastatic NSCLC lung CA on tagrisso and chemo, last treatment 3 weeks ago presents to the ED c/o chest pain and SOB x2 weeks and low hgb. Pt seen by [facility] and had CT which showed large pericardial effusion pressing on his R ventricle and small L hydropneumothorax.     Pt seen by [facility] and had CT which showed large pericardial effusion pressing on his R ventricle and small L hydropneumothorax. Endorses decreased appetite and PO intake, JIMENEZ, and cough. Denies fevers. States he is on chemo every 4 weeks. Nonsmoker.    Pt with large pericardial effusion  Plan for pericardiocentesis  pt with thrombocytopenia and to transfuse 1u plts prior to procedure  pt on RA in CCU      PAST MEDICAL & SURGICAL HISTORY:  H pylori ulcer      HTN (hypertension)      Chronic neck pain          Allergies    No Known Allergies    Intolerances        MEDICATIONS  (STANDING):  influenza   Vaccine 0.5 milliLiter(s) IntraMuscular once  lactated ringers. 1000 milliLiter(s) (125 mL/Hr) IV Continuous <Continuous>    MEDICATIONS  (PRN):      FAMILY HISTORY:      SOCIAL HISTORY: No EtOH, no tobacco    REVIEW OF SYSTEMS:    CONSTITUTIONAL: No weakness, fevers or chills  EYES/ENT: No visual changes;  No vertigo or throat pain   NECK: No pain or stiffness  RESPIRATORY: + shortness of breath  CARDIOVASCULAR: No chest pain or palpitations  GASTROINTESTINAL: No abdominal or epigastric pain. No nausea, vomiting, or hematemesis; No diarrhea or constipation. No melena or hematochezia.  GENITOURINARY: No dysuria, frequency or hematuria  NEUROLOGICAL: No numbness or weakness  SKIN: No itching, burning, rashes, or lesions   All other review of systems is negative unless indicated above.    Height (cm): 170.2 (09-30 @ 19:50)  Weight (kg): 66.4 (09-30 @ 19:50)  BMI (kg/m2): 22.9 (09-30 @ 19:50)  BSA (m2): 1.77 (09-30 @ 19:50)    T(F): 98.3 (10-01-24 @ 16:00), Max: 98.8 (10-01-24 @ 13:49)  HR: 100 (10-01-24 @ 18:30)  BP: 136/83 (10-01-24 @ 18:30)  RR: 29 (10-01-24 @ 18:30)  SpO2: 100% (10-01-24 @ 18:30)  Wt(kg): --    GENERAL: NAD, well-developed  HEAD:  Atraumatic, Normocephalic  EYES: EOMI,   CHEST/LUNG: decreased bs   HEART: tachycardic   ABDOMEN: Soft, Nontender, Nondistended;  EXTREMITIES:  no significant edema  NEUROLOGY: non-focal  SKIN: No rashes or lesions                          7.6    4.14  )-----------( 31       ( 30 Sep 2024 22:24 )             24.8       09-30    135  |  105  |  20  ----------------------------<  134[H]  4.4   |  21[L]  |  1.41[H]    Ca    8.5      30 Sep 2024 22:24    TPro  7.2  /  Alb  3.0[L]  /  TBili  0.4  /  DBili  x   /  AST  106[H]  /  ALT  92[H]  /  AlkPhos  157[H]  09-30          PT/INR - ( 30 Sep 2024 22:24 )   PT: 13.9 sec;   INR: 1.21 ratio         PTT - ( 30 Sep 2024 22:24 )  PTT:26.2 sec

## 2024-10-01 NOTE — ED ADULT NURSE NOTE - PAIN: PRESENCE, MLM
complains of pain/discomfort Isotretinoin Counseling: Patient should get monthly blood tests, not donate blood, not drive at night if vision affected, not share medication, and not undergo elective surgery for 6 months after tx completed. Side effects reviewed, pt to contact office should one occur.

## 2024-10-01 NOTE — CONSULT NOTE ADULT - PROBLEM SELECTOR RECOMMENDATION 9
multifactorial likely lung carcinoma pleural effusion , systolic heart failure , predominantly due to pericardial effusion   which is likely metastatic pericardial effusion  vs post radiation pericardial effusion   scheduled for pericardiocentesis  after platelet transfusion

## 2024-10-01 NOTE — DIETITIAN INITIAL EVALUATION ADULT - ETIOLOGY
r/t decreased ability to meet increased nutrient needs 2/2 metastatic SCLC lung CA on chemo and radiation, N/V causing poor appetite

## 2024-10-01 NOTE — CONSULT NOTE ADULT - ASSESSMENT
55 y/o Male with a PMH of metastatic NSCLC lung CA on tagrisso and chemo, last treatment 3 weeks ago presents to the ED c/o chest pain and SOB x2 weeks and low hgb. Pt seen by [facility] and had CT which showed large pericardial effusion pressing on his R ventricle and small L hydropneumothorax.    # metastatic NSCLC  - follows at [facility] [place] currently on tagrisso, alimta, avastin- LD 9/10/24  - pt follows with DR. Larry Bustillo  - will hold tagrisso while inpatient  - tagrisso can cause CHF and decrease EF  - no known history of systolic heart failure and pt has been on tagrisso for over a year     # pericardial effusion  -  CT chest- Large pericardial effusion with extrinsic compression of the right ventricle/atrium. Small right pleural effusion. Small left anterior medial upper lobe hydropneumothorax. Left basilar consolidation, may be related to the patient's known malignancy. Left-sided pleural plaques, may be related to the patient's known malignancy and/or radiationPts wife at bedside this morning, was on RA and comfortable. TTE performed post pericardiocentesis with small effusion now cytology to be sent   - WBC 4.1, HB 7.6, plt 31k - given 1u plts prior to pericardiocentesis today  - TTE with EF now 25%, and going with interventional cardiology for pericardiocentesis   - plan to add motrin if no evidence of malignant cells in cytology     # CHF  - cardio adding los dose BB, entresto once pericardiocentesis is done with low dose diuretic    will follow

## 2024-10-01 NOTE — CONSULT NOTE ADULT - SUBJECTIVE AND OBJECTIVE BOX
HIEF COMPLAINT: shortness of breath     HPI: 54 year old male with hx metastatic lung carcinoma  on chemotherapy for last 2 years prior radiation to spine /brain ,   another round of radiation to chest few weeks ago , came to hospital with complain that his SOB is not getting better after receiving chemotherapy , as he usually feels better after 2 weeks of chemotherapy , came to ER noted to have large pericardial effusion , early tamponade , has severe LV dysfunction ,  patient is tachycardic , called for cardiology consult , patient  denies any prior hx of  CAD  , HTN HLD         PAST MEDICAL & SURGICAL HISTORY:  H pylori ulcer      HTN (hypertension)      Chronic neck pain    NP Progress Note     Follow Up:  Consult    HPI:  53 y/o Male with a PMH of chronic neck pain, HTN, H pylori ulcer, metastatic SCLC lung CA on chemo and radiation last treatment 3 weeks ago presents to the ED c/o chest pain and SOB x2 weeks and low hgb. Pt seen by [facility] and had CT which showed large pericardial effusion pressing on his R ventricle and small L hydropneumothorax. Endorses decreased appetite and PO intake, JIMENEZ, and cough. Denies fevers. States he is on chemo every 4 weeks. Nonsmoker.    10/1: Patient seen, no CP, EF now 25%, and going with interventional cardio for drainage  (01 Oct 2024 11:29)        Subjective/Observations: Pt. seen and examined and evaluated. Pt. resting comfortably in bed in NAD, with no respiratory distress, no chest pain, dyspnea, palpitations, PND, or orthopnea.    REVIEW OF SYSTEMS: All other review of systems is negative unless indicated above    PAST MEDICAL & SURGICAL HISTORY:  H pylori ulcer      HTN (hypertension)      Chronic neck pain          MEDICATIONS  (STANDING):  influenza   Vaccine 0.5 milliLiter(s) IntraMuscular once  lactated ringers. 1000 milliLiter(s) (125 mL/Hr) IV Continuous <Continuous>    MEDICATIONS  (PRN):      Allergies    No Known Allergies    Intolerances          Vital Signs Last 24 Hrs  T(C): 37.1 (01 Oct 2024 13:55), Max: 37.1 (01 Oct 2024 13:49)  T(F): 98.8 (01 Oct 2024 13:55), Max: 98.8 (01 Oct 2024 13:49)  HR: 114 (01 Oct 2024 13:55) (94 - 114)  BP: 133/98 (01 Oct 2024 13:55) (117/55 - 155/92)  BP(mean): 100 (01 Oct 2024 12:30) (73 - 116)  RR: 18 (01 Oct 2024 13:55) (17 - 31)  SpO2: 100% (01 Oct 2024 13:55) (98% - 100%)    Parameters below as of 01 Oct 2024 13:55  Patient On (Oxygen Delivery Method): room air        I&O's Summary    30 Sep 2024 07:01  -  01 Oct 2024 07:00  --------------------------------------------------------  IN: 125 mL / OUT: 0 mL / NET: 125 mL    01 Oct 2024 07:01  -  01 Oct 2024 15:28  --------------------------------------------------------  IN: 608 mL / OUT: 1560 mL / NET: -952 mL      Weight (kg): 66.4 (09-30 @ 19:50)        LABS: All Labs Reviewed:                        7.6    4.14  )-----------( 31       ( 30 Sep 2024 22:24 )             24.8     30 Sep 2024 22:24    135    |  105    |  20     ----------------------------<  134    4.4     |  21     |  1.41     Ca    8.5        30 Sep 2024 22:24    TPro  7.2    /  Alb  3.0    /  TBili  0.4    /  DBili  x      /  AST  106    /  ALT  92     /  AlkPhos  157    30 Sep 2024 22:24    PT/INR - ( 30 Sep 2024 22:24 )   PT: 13.9 sec;   INR: 1.21 ratio         PTT - ( 30 Sep 2024 22:24 )  PTT:26.2 sec           Echo:    Cath:    EKG:     Interpretation of Telemetry:      Physical Exam:  Appearance: [ ] Normal  [ ] abnormal [ ] NAD   Eyes: [ ] PERRL [ ] EOMI  HEENT: [ ] Normal [ ] Abnormal oral mucosa [ ]NC/AT  Cardiovascular: [ ] S1 [ ] S2 [ ] RRR [ ] m/r/g [ ]edema [ ] JVP  Procedural Access Site: [ ]  hematoma [ ] tender to palpation [ ] 2+ pulse [ ] bruit [ ] Ecchymosis  Respiratory: [ ] Clear to auscultation bilaterally  Gastrointestinal: [ ] Soft [ ] tenderness[ ] distension [ ] BS  Musculoskeletal: [ ] clubbing [ ] joint deformity   Neurologic: [ ] Non-focal  Lymphatic: [ ] lymphadenopathy  Psychiatry: [ ] AAOx3  [ ] confused [ ] disoriented [ ] Mood & affect appropriate  Skin: [ ]  rashes [ ] ecchymoses [ ] cyanosis   53 y/o Male with a PMH of chronic neck pain, HTN, H pylori ulcer, metastatic SCLC lung CA on chemo and radiation last treatment 3 weeks ago presents to the ED c/o chest pain and SOB x2 weeks and low hgb. Pt seen by [facility] and had CT which showed large pericardial effusion pressing on his R ventricle and small L hydropneumothorax. Endorses decreased appetite and PO intake, JIMENEZ, and cough. Denies fevers. States he is on chemo every 4 weeks. Nonsmoker.  In Er pt. was noted to have a large pericardial effusion , early tamponade.  Interventional cardiology consulted for pericardiocentesis       PAST MEDICAL & SURGICAL HISTORY:  H pylori ulcer  HTN (hypertension)  Chronic neck pain      Subjective/Observations: Pt. seen and examined and evaluated. Pt. resting comfortably in bed in NAD, with no respiratory distress, no chest pain, dyspnea, palpitations, PND, or orthopnea.  Pt. reports that he is only SOB with exertion       REVIEW OF SYSTEMS: All other review of systems is negative unless indicated above    MEDICATIONS  (STANDING):  influenza   Vaccine 0.5 milliLiter(s) IntraMuscular once  lactated ringers. 1000 milliLiter(s) (125 mL/Hr) IV Continuous <Continuous>    MEDICATIONS  (PRN):      Allergies: No Known Allergies      Vital Signs Last 24 Hrs  T(C): 37.1 (01 Oct 2024 13:55), Max: 37.1 (01 Oct 2024 13:49)  T(F): 98.8 (01 Oct 2024 13:55), Max: 98.8 (01 Oct 2024 13:49)  HR: 114 (01 Oct 2024 13:55) (94 - 114)  BP: 133/98 (01 Oct 2024 13:55) (117/55 - 155/92)  BP(mean): 100 (01 Oct 2024 12:30) (73 - 116)  RR: 18 (01 Oct 2024 13:55) (17 - 31)  SpO2: 100% (01 Oct 2024 13:55) (98% - 100%)    Parameters below as of 01 Oct 2024 13:55  Patient On (Oxygen Delivery Method): room air        I&O's Summary    30 Sep 2024 07:01  -  01 Oct 2024 07:00  --------------------------------------------------------  IN: 125 mL / OUT: 0 mL / NET: 125 mL    01 Oct 2024 07:01  -  01 Oct 2024 15:28  --------------------------------------------------------  IN: 608 mL / OUT: 1560 mL / NET: -952 mL      Weight (kg): 66.4 (09-30 @ 19:50)        LABS: All Labs Reviewed:                        7.6    4.14  )-----------( 31       ( 30 Sep 2024 22:24 )             24.8     30 Sep 2024 22:24    135    |  105    |  20     ----------------------------<  134    4.4     |  21     |  1.41     Ca    8.5        30 Sep 2024 22:24    TPro  7.2    /  Alb  3.0    /  TBili  0.4    /  DBili  x      /  AST  106    /  ALT  92     /  AlkPhos  157    30 Sep 2024 22:24    PT/INR - ( 30 Sep 2024 22:24 )   PT: 13.9 sec;   INR: 1.21 ratio         PTT - ( 30 Sep 2024 22:24 )  PTT:26.2 sec           Echo:  < from: TTE W or WO Ultrasound Enhancing Agent (10.01.24 @ 07:43) >  CONCLUSIONS:      1. Left ventricular systolic function is severely decreased with an ejection fraction of 20 %.   2. Small right ventricular cavity size and normal right ventricular systolic function.   3. There is a large pericardial effusion with early tamponade physiology. This was supported by evidence of, greater than 30% respiratory variation across the mitral valve E wave and diastolic collapse of the right atrium that exceeds 1/3 of the cardiac cycle. The pericardium appears markedly thickened.      < from: CT Chest No Cont (09.30.24 @ 23:26) >  IMPRESSION:  Evaluation somewhat limited in the absence of prior outside imaging for   comparison.    Large pericardial effusion with extrinsic compression of the right   ventricle/atrium.    Small right pleural effusion. Small left anterior medial upper lobe   hydropneumothorax    Left basilar consolidation, may be related to the patient's known   malignancy, however in the absence of outside imaging for comparison,   infection is also a consideration in the appropriate clinical context.    Left-sided pleural plaques, may be related to the patient's known   malignancy and/or radiation.    Physical Exam:  Appearance: [ ] Normal  [ ] abnormal [X ] NAD   Eyes: [ ] PERRL [ ] EOMI  HEENT: [ ] Normal [ ] Abnormal oral mucosa [ ]NC/AT  Cardiovascular: [X ] S1 [X ] S2 [ ] RRR [ ] m/r/g [ ]edema [ ] JVP  Respiratory: [X ] Clear to auscultation bilaterally  Neurologic: [ ] Non-focal  Lymphatic: [ ] lymphadenopathy  Psychiatry: [X ] AAOx3  [ ] confused [ ] disoriented [ ] Mood & affect appropriate  Skin: [ ]  rashes [ ] ecchymoses [ ] cyanosis

## 2024-10-01 NOTE — ED ADULT NURSE NOTE - NSFALLUNIVINTERV_ED_ALL_ED
Bed/Stretcher in lowest position, wheels locked, appropriate side rails in place/Call bell, personal items and telephone in reach/Instruct patient to call for assistance before getting out of bed/chair/stretcher/Non-slip footwear applied when patient is off stretcher/[place] to call system/Physically safe environment - no spills, clutter or unnecessary equipment/Purposeful proactive rounding/Room/bathroom lighting operational, light cord in reach

## 2024-10-01 NOTE — DIETITIAN INITIAL EVALUATION ADULT - NSFNSGIIOFT_GEN_A_CORE
I&O's Detail    30 Sep 2024 07:01  -  01 Oct 2024 07:00  --------------------------------------------------------  IN:    Lactated Ringers: 125 mL  Total IN: 125 mL    OUT:  Total OUT: 0 mL    Total NET: 125 mL

## 2024-10-01 NOTE — PATIENT PROFILE ADULT - FALL HARM RISK - HARM RISK INTERVENTIONS
Assistance with ambulation/Communicate Risk of Fall with Harm to all staff/Monitor for mental status changes/Monitor gait and stability/Reinforce activity limits and safety measures with patient and family/Tailored Fall Risk Interventions/Use of alarms - bed, chair and/or voice tab/Visual Cue: Yellow wristband and red socks/Bed in lowest position, wheels locked, appropriate side rails in place/Call bell, personal items and telephone in reach/Instruct patient to call for assistance before getting out of bed or chair/Non-slip footwear when patient is out of bed/[place] to call system/Physically safe environment - no spills, clutter or unnecessary equipment/Purposeful Proactive Rounding/Room/bathroom lighting operational, light cord in reach

## 2024-10-01 NOTE — DIETITIAN INITIAL EVALUATION ADULT - OTHER INFO
55 y/o M with a PMHx of chronic neck pain, HTN, H pylori ulcer, metastatic SCLC lung CA on chemo and radiation last treatment 3 weeks ago presented to the ED c/o chest pain and SOB x2 weeks and low hgb. Pt seen by JEANNA today, had CT which showed large pericardial effusion pressing on his R ventricle and L hydropneumothorax. Endorses decreased appetite and PO intake, JIMENEZ, and cough. States he is on chemo every 4 weeks. Admitted for pericardial effusion, and  L sided pleural effusion 2/2 metastatic SCLC; tx to CCU.    Visited pt and wife this morning, pt NPO since admission. Endorses weight loss over the last 3 weeks since his last chemo session d/t nausea and vomiting when he eats which has caused poor po intake. Reports that his UBW was 140#, however is about ~132# now. RD obtained bedscale wt on 10/1 - 132#> No weight hx to review. Weight loss of 8# (5.7%) x 3 weeks - clinsig and severe. NFPE reveals mild to moderate muscle/ fat wasting, pt meets criteria for PCM at this time. Recommend to advance diet to Regular as soon as medically feasible. Encouraged high kcal/ high protein intake, pt receptive to trialing Ensure Plus High Protein (van) BID and [place] daily (van) in effort to optimize PO intake once diet is advanced. Pt states that taking anti-emetics was working prior to his last chemo session, however was not working over the last 3 weeks. Consider adding anti-emetics to meals and snacks when diet is advanced. Monitor closely for refeeding syndrome - please obtain BMP, Mg, and Phos levels. Monitor and replete K, Phos, Mg prn if begins to downtrend, especially if IV dextrose started. If nutrition support is initiated, recommend to check refeeding labs BID x 2-3 days upon initiation and then will reevaluate. Consider adding 200mg of thiamine and MVI w/ minerals daily. Please see additional recommendations below.

## 2024-10-01 NOTE — DIETITIAN INITIAL EVALUATION ADULT - ADD RECOMMEND
1) Advance diet to Regular as soon as medically feasible  2) Add ensure plus high protein BID (provides 350 kcal, 20g protein/ shake) and [place] daily to optimize nutritional needs (provides 325 kcal, 16 g protein/ shake) when diet is advanced  3) Obtain vitamin D 25OH level to assess nutriture  4) Please obtain daily weights  5) MVI w/ minerals daily to ensure 100% RDA met  6) Consider adding thiamine 200 mg daily 2/2 poor PO intake/ malnutrition  7) Encourage protein-rich foods, maximize food preferences  8) Monitor bowel movements, if no BM for >3 days, consider implementing bowel regimen.  9) Consider anti-emetics w/ meals and snacks  10) Consider adding appetite stimulant such as Remeron or Marinol 2/2 chronically poor appetite/ PO intake   11) Monitor closely for refeeding syndrome - please obtain BMP, Mg, and Phos levels. Monitor and replete K, Phos, Mg prn if begins to downtrend, especially if IV dextrose started. If nutrition support is initiated, recommend to check refeeding labs BID x 2-3 days upon initiation and then will reevaluate.   12) Confirm goals of care regarding nutrition support  RD will continue to monitor PO intake, labs, hydration, and wt prn.

## 2024-10-01 NOTE — DIETITIAN INITIAL EVALUATION ADULT - REASON FOR ADMISSION
[facility]     Chief Complaint Chief Complaint   Patient presents with   • Foot     left foot , DOI 3/13/2020      Date 03/16/20       ASSESSMENT:  1. Closed fracture of base of fifth metatarsal bone of left foot, initial encounter    2. Left foot pain        PLAN:  · Patient was seen and evaluated today.   · Patient's condition was discussed in great detail with the patient today.   · Xrays were not  taken today.  These are personally interpreted by myself.  Discussed reviewed with the patient.  · Patient does have a fracture at the base of the 5th metatarsal.  · We did discuss surgical as well as conservative care.  Patient does not want surgery.  From a conservative standpoint, we did discuss 4-6 weeks nonweightbearing.  Patient states that she also cannot comply with that.  At this point, we did discuss the sequela complications of the site healing.  Patient states that she would like able.  A tall Cam boot was dispensed to the patient today.  Ideally, I did advise the patient that she should not be walking.  Patient understands and would like to proceed.  Patient states that she will keep her walking to limitation.  · In, we did discuss NSAID use.  · Patient can follow up in 6 weeks for new x-rays as well as re-evaluation.  · Patient understands and is in agreement with the treatment plan. All questions were answered to the patients level of satisfaction.     Orders Placed This Encounter   • REBOUND AIR WALKER HIGH TOP OFF THE SHELF   • XR Foot 3+ View Standing Left       I did advise the patient to Return in about 6 weeks (around 4/27/2020).    Thank you for allowing me to participate in your foot and ankle needs. If you have any questions please feel free to contact my office [phone] or message me on [place].     Hira Lainez DPM    CC:   Chief Complaint   Patient presents with   • Foot     left foot , DOI 3/13/2020       HISTORY OF PRESENT ILLNESS:  Ms. Flores is a  pleasant 78 year old female who presents to the clinic today with concerns of pain to the left foot. Patient states this has been going on since 03/13/2020.  Patient states that she mis-stepped. Patient states that she has been having more pain to the right foot. Patient states she did go to the urgent care and xrays were taken. She states that she may have broken her big toe. She states she has pain to the top and side of her foot. Patient has been walking with a postop shoe. Patient states her pain is a 6/10.  Patient states that she has been icing and this does alleviate pain.  Within the last few days it has been slowly so getting better.  Patient has been walking.  Walking does make it worse.  Rest makes it better.  She denies any other complaints at this time. Patient currently denies any nausea, vomiting, fever, chills or shortness of breath.    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   • Wears glasses    • Wears partial dentures     Upper partial plate       Family History   Problem Relation Age of Onset   • Stroke/TIA Maternal Aunt    • Cancer Paternal Uncle         abdominal   • Cancer Paternal Uncle         unk origin   • Bipolar disorder Daughter    • Heart disease Mother    • Substance abuse Father         ETOH abuse   • Arthritis Brother         Rheumatoid        Social History     Socioeconomic History   • Marital status: /Civil Union     Spouse name: Not on file   • Number of children: Not on file   • Years of education:  Not on file   • Highest education level: Not on file   Occupational History   • Not on file   Social Needs   • Financial resource strain: Not on file   • Food insecurity:     Worry: Not on file     Inability: Not on file   • Transportation needs:     Medical: Not on file     Non-medical: Not on file   Tobacco Use   • Smoking status: Former Smoker     Packs/day: 1.00     Years: 3.00     Pack years: 3.00     Types: Cigarettes     Last attempt to quit: 10/17/1963     Years since quittin.4   • Smokeless tobacco: Never Used   Substance and Sexual Activity   • Alcohol use: No     Frequency: Never     Drinks per session: 1 or 2     Binge frequency: Never   • Drug use: No   • Sexual activity: Not Currently   Lifestyle   • Physical activity:     Days per week: Not on file     Minutes per session: Not on file   • Stress: Not on file   Relationships   • Social connections:     Talks on phone: Not on file     Gets together: Not on file     Attends Jainism service: Not on file     Active member of club or organization: Not on file     Attends meetings of clubs or organizations: Not on file     Relationship status: Not on file   • Intimate partner violence:     Fear of current or ex partner: Not on file     Emotionally abused: Not on file     Physically abused: Not on file     Forced sexual activity: Not on file   Other Topics Concern   • Not on file   Social History Narrative   • Not on file       Review of Systems:  Constitutional/Psyiactric: Negative for fever, chills, nausea, vomiting or unexpected weight loss  Musculoskeletal: No new muscle joint ligament or bone aches, pains, limitations or abnormalities      PHYSICAL EXAMINATION:  Visit Vitals  Temp 98.2 °F (36.8 °C)   Ht 5' 4\" (1.626 m)   Wt 90.7 kg   LMP  (LMP Unknown)   BMI 34.32 kg/m²     General:  Well-groomed, well-dressed, no acute distress, alert and orientated x 3, mood and affect are normal   Integument:  Skin is intact, warm, dry and supple bilaterally.  No open wounds.  Vascular: Dorsalis pedis 2/4 bilateral,  Posterior tibial pulses are 1/4 bilaterally, Capillary Refill time is < 3 seconds to all digits  Neurologic:  Gross sensation is intact bilaterally, Epicritic sensation is intact bilaterally, Achilles tendon reflex intact bilaterally  Musculoskeletal: Manual Muscle Test is 5/5 in all 4 quadrants right, 5/5 in 3 quadrants 3/5 foot eversion left, ROM of ankle joint is fluid bilateral, DF is limited with knee extended bilateral, DF is limited with knee flexed bilateral , subtalar joint inversion and eversion is fluid with no pain, limitation ROM or crepitus, there is moderate edema noted to the left foot with ecchymosis, there is pain with palpation to the base of the 5th metatarsal, palpation within the 1st interspace as well as the Lisfranc joint was stressed today there is no pain there there is some mild tenderness over the anterior aspect of the ankle as well        DIAGNOSTIC STUDIES:  Three views left foot x-ray 03/13/2020  Fracture noted at the base of the 5th metatarsal this is mildly displaced   Acute pericarditis

## 2024-10-01 NOTE — DIETITIAN INITIAL EVALUATION ADULT - PERTINENT LABORATORY DATA
09-30    135  |  105  |  20  ----------------------------<  134[H]  4.4   |  21[L]  |  1.41[H]    Ca    8.5      30 Sep 2024 22:24    TPro  7.2  /  Alb  3.0[L]  /  TBili  0.4  /  DBili  x   /  AST  106[H]  /  ALT  92[H]  /  AlkPhos  157[H]  09-30

## 2024-10-01 NOTE — CONSULT NOTE ADULT - CONSULT REASON
Pericardial/pleural effusion
h/o nsclc
Pericardial effusion, pleural effusion
SOB pericardial effusion severe LV dysfunction
SOB pericardial effusion severe LV dysfunction

## 2024-10-01 NOTE — H&P ADULT - HISTORY OF PRESENT ILLNESS
55 y/o Male with a PMH of chronic neck pain, HTN, H pylori ulcer, metastatic SCLC lung CA on chemo and radiation last treatment 3 weeks ago presents to the ED c/o chest pain and SOB x2 weeks and low hgb. Pt seen by [facility] and had CT which showed large pericardial effusion pressing on his R ventricle and small L hydropneumothorax. Endorses decreased appetite and PO intake, JIMENEZ, and cough. Denies fevers. States he is on chemo every 4 weeks. Nonsmoker.    10/1: Patient seen, no CP, EF now 25%, and going with interventional cardio for drainage

## 2024-10-02 ENCOUNTER — RESULT REVIEW (OUTPATIENT)
Age: 54
End: 2024-10-02

## 2024-10-02 LAB
ANION GAP SERPL CALC-SCNC: 8 MMOL/L — SIGNIFICANT CHANGE UP (ref 5–17)
B PERT IGG+IGM PNL SER: CLEAR — SIGNIFICANT CHANGE UP
BUN SERPL-MCNC: 17 MG/DL — SIGNIFICANT CHANGE UP (ref 7–23)
CALCIUM SERPL-MCNC: 7.9 MG/DL — LOW (ref 8.5–10.1)
CHLORIDE SERPL-SCNC: 104 MMOL/L — SIGNIFICANT CHANGE UP (ref 96–108)
CO2 SERPL-SCNC: 24 MMOL/L — SIGNIFICANT CHANGE UP (ref 22–31)
COLOR FLD: YELLOW — SIGNIFICANT CHANGE UP
CREAT SERPL-MCNC: 1.24 MG/DL — SIGNIFICANT CHANGE UP (ref 0.5–1.3)
EGFR: 70 ML/MIN/1.73M2 — SIGNIFICANT CHANGE UP
EOSINOPHIL # FLD: 0 % — SIGNIFICANT CHANGE UP
FLUID INTAKE SUBSTANCE CLASS: SIGNIFICANT CHANGE UP
FOLATE+VIT B12 SERBLD-IMP: 0 % — SIGNIFICANT CHANGE UP
GLUCOSE SERPL-MCNC: 97 MG/DL — SIGNIFICANT CHANGE UP (ref 70–99)
HCT VFR BLD CALC: 24.5 % — LOW (ref 39–50)
HGB BLD-MCNC: 7.8 G/DL — LOW (ref 13–17)
LYMPHOCYTES # FLD: 17 % — SIGNIFICANT CHANGE UP
MAGNESIUM SERPL-MCNC: 1.9 MG/DL — SIGNIFICANT CHANGE UP (ref 1.6–2.6)
MCHC RBC-ENTMCNC: 31.3 PG — SIGNIFICANT CHANGE UP (ref 27–34)
MCHC RBC-ENTMCNC: 31.8 GM/DL — LOW (ref 32–36)
MCV RBC AUTO: 98.4 FL — SIGNIFICANT CHANGE UP (ref 80–100)
MESOTHL CELL # FLD: 5 % — SIGNIFICANT CHANGE UP
MONOS+MACROS # FLD: 64 % — SIGNIFICANT CHANGE UP
NEUTROPHILS-BODY FLUID: 2 % — SIGNIFICANT CHANGE UP
NIGHT BLUE STAIN TISS: SIGNIFICANT CHANGE UP
NRBC # FLD: 0 % — SIGNIFICANT CHANGE UP
OTHER CELLS FLD MANUAL: 12 % — SIGNIFICANT CHANGE UP
PHOSPHATE SERPL-MCNC: 2.9 MG/DL — SIGNIFICANT CHANGE UP (ref 2.5–4.5)
PLATELET # BLD AUTO: 84 K/UL — LOW (ref 150–400)
POTASSIUM SERPL-MCNC: 3.6 MMOL/L — SIGNIFICANT CHANGE UP (ref 3.5–5.3)
POTASSIUM SERPL-SCNC: 3.6 MMOL/L — SIGNIFICANT CHANGE UP (ref 3.5–5.3)
RBC # BLD: 2.49 M/UL — LOW (ref 4.2–5.8)
RBC # FLD: 17.5 % — HIGH (ref 10.3–14.5)
RCV VOL RI: 1000 /UL — HIGH (ref 0–0)
SODIUM SERPL-SCNC: 136 MMOL/L — SIGNIFICANT CHANGE UP (ref 135–145)
SPECIMEN SOURCE: SIGNIFICANT CHANGE UP
TOTAL NUCLEATED CELL COUNT, BODY FLUID: 204 /UL — SIGNIFICANT CHANGE UP
TUBE TYPE: SIGNIFICANT CHANGE UP
WBC # BLD: 6.35 K/UL — SIGNIFICANT CHANGE UP (ref 3.8–10.5)
WBC # FLD AUTO: 6.35 K/UL — SIGNIFICANT CHANGE UP (ref 3.8–10.5)

## 2024-10-02 PROCEDURE — 93321 DOPPLER ECHO F-UP/LMTD STD: CPT | Mod: 26

## 2024-10-02 PROCEDURE — 99232 SBSQ HOSP IP/OBS MODERATE 35: CPT

## 2024-10-02 PROCEDURE — 93308 TTE F-UP OR LMTD: CPT | Mod: 26

## 2024-10-02 PROCEDURE — 99152 MOD SED SAME PHYS/QHP 5/>YRS: CPT

## 2024-10-02 PROCEDURE — 93458 L HRT ARTERY/VENTRICLE ANGIO: CPT | Mod: 26

## 2024-10-02 PROCEDURE — 99233 SBSQ HOSP IP/OBS HIGH 50: CPT

## 2024-10-02 RX ORDER — SACUBITRIL AND VALSARTAN 97; 103 MG/1; MG/1
1 TABLET, FILM COATED ORAL
Refills: 0 | Status: DISCONTINUED | OUTPATIENT
Start: 2024-10-02 | End: 2024-10-04

## 2024-10-02 RX ORDER — FOLIC ACID 1 MG/1
1 TABLET ORAL
Refills: 0 | DISCHARGE

## 2024-10-02 RX ORDER — METOCLOPRAMIDE HCL 5 MG
1 TABLET ORAL
Refills: 0 | DISCHARGE

## 2024-10-02 RX ADMIN — SACUBITRIL AND VALSARTAN 1 TABLET(S): 97; 103 TABLET, FILM COATED ORAL at 21:22

## 2024-10-02 RX ADMIN — SACUBITRIL AND VALSARTAN 1 TABLET(S): 97; 103 TABLET, FILM COATED ORAL at 10:54

## 2024-10-02 NOTE — CHART NOTE - NSCHARTNOTEFT_GEN_A_CORE
Department of Cardiology                                                               Division of Interventional Cardiology                                                               [facility] /[address] [address]                                                                                 [phone]           Post Procedure Progress Note  Patient is a 54y old  Male who presents with a chief complaint of pericardial process (02 Oct 2024 09:24)      Patient is  now s/p left heart catheterization    s/p LHC : revealing non obs disease, slightly improved EF   Pt denies chest pain/SOB/palpitations post cath.  PROCEDURE SITE: --RRA accessed. hemoband to be removed 1 hour post placement.  Site is without hematoma or bleeding. Sensation and PEREZ intact. Distal pulses palpable 2+, capillary refill < 2 seconds.       Vital Signs  T(C): 37.1 (10-02-24 @ 09:41), Max: 37.4 (10-01-24 @ 20:03)  HR: 111 (10-02-24 @ 10:00) (99 - 119)  BP: 129/83 (10-02-24 @ 10:00) (109/81 - 164/87)  RR: 38 (10-02-24 @ 10:00) (14 - 38)  SpO2: 100% (10-02-24 @ 10:00) (95% - 100%)  Wt(kg): --      PHYSICAL EXAM:  NEURO: Non-focal, AxOx3.  No neuro deficits   CHEST/LUNG: Clear to auscultation bilaterally; No wheeze  HEART: s1 s2 Regular rate and rhythm; No murmurs, rubs, or gallops  ABDOMEN: Soft, Nontender, Nondistended; Bowel sounds present X 4 quadrants   EXTREMITIES:  2+ Peripheral Pulses, No clubbing, cyanosis, or edema   VASCULAR: Peripheral pulses palpable 2+ bilaterally      PROCEDURE RESULTS:  full report to follow         PLAN:  -VS, diet, activity as per post cath orders  - IVF per ALYSA protocol   -Encourage PO fluids  -Continue current medications  -Post cath instructions reviewed, post sedation instructions reviewed; patient verbalizes and understands instructions  --Plan of care discussed with patient, RN and Dr. Rodrigues  -rest of care per primary and general cardiology

## 2024-10-02 NOTE — PROGRESS NOTE ADULT - SUBJECTIVE AND OBJECTIVE BOX
INTERVAL HPI/OVERNIGHT EVENTS:  Patient S&E at bedside.  s/p pericardiocentesis yest 610 cc of straw colored sediment sent   pt s/p 1u plts   WBC 6.35, HB 7.8, plt 86k today   repeat TTE being performed in room   pt comfortable, mild chest pain     PAST MEDICAL & SURGICAL HISTORY:  H pylori ulcer      HTN (hypertension)      Chronic neck pain          FAMILY HISTORY:      VITAL SIGNS:  T(F): 99.3 (10-01-24 @ 20:03)  HR: 108 (10-02-24 @ 06:00)  BP: 126/75 (10-02-24 @ 06:00)  RR: 23 (10-02-24 @ 06:00)  SpO2: 97% (10-02-24 @ 06:00)  Wt(kg): --    PHYSICAL EXAM:    Constitutional: NAD, pt on side having tte performed   Respiratory: pericardial drain in place  Cardiovascular: RRR,   Neurological: AAOx3      MEDICATIONS  (STANDING):  influenza   Vaccine 0.5 milliLiter(s) IntraMuscular once  lactated ringers. 1000 milliLiter(s) (125 mL/Hr) IV Continuous <Continuous>    MEDICATIONS  (PRN):      Allergies    No Known Allergies    Intolerances        LABS:                        7.8    6.35  )-----------( 84       ( 02 Oct 2024 05:35 )             24.5     10-02    136  |  104  |  17  ----------------------------<  97  3.6   |  24  |  1.24    Ca    7.9[L]      02 Oct 2024 05:35  Phos  2.9     10-02  Mg     1.9     10-02    TPro  7.2  /  Alb  3.0[L]  /  TBili  0.4  /  DBili  x   /  AST  106[H]  /  ALT  92[H]  /  AlkPhos  157[H]  09-30    PT/INR - ( 30 Sep 2024 22:24 )   PT: 13.9 sec;   INR: 1.21 ratio         PTT - ( 30 Sep 2024 22:24 )  PTT:26.2 sec  Urinalysis Basic - ( 02 Oct 2024 05:35 )    Color: x / Appearance: x / SG: x / pH: x  Gluc: 97 mg/dL / Ketone: x  / Bili: x / Urobili: x   Blood: x / Protein: x / Nitrite: x   Leuk Esterase: x / RBC: x / WBC x   Sq Epi: x / Non Sq Epi: x / Bacteria: x        RADIOLOGY & ADDITIONAL TESTS:  Studies reviewed.

## 2024-10-02 NOTE — BRIEF OPERATIVE NOTE - NSICDXBRIEFPROCEDURE_GEN_ALL_CORE_FT
PROCEDURES:  Left heart cardiac cath 02-Oct-2024 13:26:47  Johanna Huerta  
PROCEDURES:  Pericardiocentesis 01-Oct-2024 19:41:14  Karrie Rosas

## 2024-10-02 NOTE — PROGRESS NOTE ADULT - PROBLEM SELECTOR PLAN 1
multifactorial likely lung carcinoma pleural effusion , systolic heart failure , predominantly due to pericardial effusion   which is likely metastatic pericardial effusion  vs post radiation pericardial effusion. S/P pericardial centesis yesterday. Pt has a low Hgb consistently. F/U CBC. multifactorial likely lung carcinoma pleural effusion , systolic heart failure , predominantly due to pericardial effusion   which is likely metastatic pericardial effusion  vs post radiation pericardial effusion. S/P pericardial centesis yesterday. Pt has a low Hgb consistently. Likely from Cx.

## 2024-10-02 NOTE — PHARMACOTHERAPY INTERVENTION NOTE - COMMENTS
Medication reconciliation completed.  Reviewed Medication list and confirmed med allergies with patient & Family at bedside; confirmed with Dr. [facility].  Pt goes for Chemo every 4 weeks, last round was 3 weeks ago & pt is scheduled for next dose on Monday 10/7.

## 2024-10-02 NOTE — PROGRESS NOTE ADULT - SUBJECTIVE AND OBJECTIVE BOX
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 Pt seen by [facility] and had CT which showed large pericardial effusion pressing on his R ventricle and small L hydropneumothorax. Endorses decreased appetite and PO intake, JIMENEZ, and cough. Denies fevers. States he is on chemo every 4 weeks. Nonsmoker.    10/1: Patient seen, no CP, EF now 25%, and going with interventional cardio for drainage   10/2: No events over night, 100 cc from pericardial drain, for possible cath today       PAST MEDICAL & SURGICAL HISTORY:  H pylori ulcer      HTN (hypertension)      Chronic neck pain          FAMILY HISTORY:      Social Hx:    Allergies    No Known Allergies    Intolerances            ICU Vital Signs Last 24 Hrs  T(C): 37.1 (02 Oct 2024 09:41), Max: 37.4 (01 Oct 2024 20:03)  T(F): 98.8 (02 Oct 2024 09:41), Max: 99.3 (01 Oct 2024 20:03)  HR: 111 (02 Oct 2024 10:00) (99 - 119)  BP: 129/83 (02 Oct 2024 10:00) (109/81 - 164/87)  BP(mean): 98 (02 Oct 2024 10:00) (73 - 114)  ABP: --  ABP(mean): --  RR: 38 (02 Oct 2024 10:00) (14 - 38)  SpO2: 100% (02 Oct 2024 10:00) (95% - 100%)    O2 Parameters below as of 02 Oct 2024 06:00  Patient On (Oxygen Delivery Method): room air                I&O's Summary    01 Oct 2024 07:01  -  02 Oct 2024 07:00  --------------------------------------------------------  IN: 1128 mL / OUT: 2790 mL / NET: -1662 mL                              7.8    6.35  )-----------( 84       ( 02 Oct 2024 05:35 )             24.5       10-02    136  |  104  |  17  ----------------------------<  97  3.6   |  24  |  1.24    Ca    7.9[L]      02 Oct 2024 05:35  Phos  2.9     10-02  Mg     1.9     10-02    TPro  7.2  /  Alb  3.0[L]  /  TBili  0.4  /  DBili  x   /  AST  106[H]  /  ALT  92[H]  /  AlkPhos  157[H]  09-30                Urinalysis Basic - ( 02 Oct 2024 05:35 )    Color: x / Appearance: x / SG: x / pH: x  Gluc: 97 mg/dL / Ketone: x  / Bili: x / Urobili: x   Blood: x / Protein: x / Nitrite: x   Leuk Esterase: x / RBC: x / WBC x   Sq Epi: x / Non Sq Epi: x / Bacteria: x        MEDICATIONS  (STANDING):  influenza   Vaccine 0.5 milliLiter(s) IntraMuscular once  lactated ringers. 1000 milliLiter(s) (125 mL/Hr) IV Continuous <Continuous>  sacubitril 24 mG/valsartan 26 mG 1 Tablet(s) Oral two times a day    MEDICATIONS  (PRN):      DVT Prophylaxis:    Advanced Directives:  Discussed with:    Visit Information:    ** Time is exclusive of billed procedures and/or teaching and/or routine family updates.

## 2024-10-02 NOTE — PROGRESS NOTE ADULT - NUTRITIONAL ASSESSMENT
This patient has been assessed with a concern for Malnutrition and has been determined to have a diagnosis/diagnoses of Moderate protein-calorie malnutrition.    This patient is being managed with:   Diet DASH/TLC-  Sodium & Cholesterol Restricted  Entered: Oct  1 2024 10:06AM    The following pending diet order is being considered for treatment of Moderate protein-calorie malnutrition:  Diet Regular-  Supplement Feeding Modality:  Oral  Ensure Plus High Protein Cans or Servings Per Day:  1       Frequency:  Two Times a day  Ensure Plant-Based Cans or Servings Per Day:  1       Frequency:  Daily  Entered: Oct  2 2024 10:09AM

## 2024-10-02 NOTE — PROGRESS NOTE ADULT - SUBJECTIVE AND OBJECTIVE BOX
Subjective: Pt in bed NAD s/p pericardialcentesis yesterday with interventional cardiologist     T(C): 36.7 (10-02-24 @ 16:19), Max: 37.4 (10-01-24 @ 20:03)  HR: 108 (10-02-24 @ 15:30) (99 - 118)  BP: 123/88 (10-02-24 @ 15:30) (109/81 - 164/87)  ABP: --  ABP(mean): --  RR: 24 (10-02-24 @ 15:30) (14 - 38)  SpO2: 100% (10-02-24 @ 15:00) (97% - 100%) RA   Wt(kg): --  CVP(mm Hg): --  CO: --  CI: --  PA: --                                              Tele:  tachycardic      Pericardial drain  150cc                                         10-02    136  |  104  |  17  ----------------------------<  97  3.6   |  24  |  1.24    Ca    7.9[L]      02 Oct 2024 05:35  Phos  2.9     10-02  Mg     1.9     10-02    TPro  7.2  /  Alb  3.0[L]  /  TBili  0.4  /  DBili  x   /  AST  106[H]  /  ALT  92[H]  /  AlkPhos  157[H]  09-30                               7.8    6.35  )-----------( 84       ( 02 Oct 2024 05:35 )             24.5        PT/INR - ( 30 Sep 2024 22:24 )   PT: 13.9 sec;   INR: 1.21 ratio         PTT - ( 30 Sep 2024 22:24 )  PTT:26.2 sec         CAPILLARY BLOOD GLUCOSE               CXR:        Exam   Neuro: Alert Awake   Pulm: decreased at bases   CV: tachy S1 S2 + pericardial drain   Abd:  soft   Extremities: warm          Assessment:  54yMale    with PAST MEDICAL & SURGICAL HISTORY:  H pylori ulcer      HTN (hypertension)      Chronic neck pain            Plan:

## 2024-10-02 NOTE — BRIEF OPERATIVE NOTE - NSICDXBRIEFPREOP_GEN_ALL_CORE_FT
PRE-OP DIAGNOSIS:  Acute systolic congestive heart failure 02-Oct-2024 13:27:08  Johanna Huerta  
exertion
PRE-OP DIAGNOSIS:  Pericardial tamponade 01-Oct-2024 19:41:37  Karrie Rosas

## 2024-10-02 NOTE — BRIEF OPERATIVE NOTE - OPERATION/FINDINGS
non obs disease 
moderate to large pericardial effusion, straw colored fluid with heavy white sediment

## 2024-10-02 NOTE — PROGRESS NOTE ADULT - SUBJECTIVE AND OBJECTIVE BOX
CHIEF COMPLAINT/DIAGNOSIS:    HPI:53 y/o Male with a PMH of chronic neck pain, HTN, H pylori ulcer, metastatic SCLC lung CA on chemo and radiation last treatment 3 weeks ago presents to the ED c/o chest pain and SOB x2 weeks and low hgb. Pt seen by [facility] and had CT which showed large pericardial effusion pressing on his R ventricle and small L hydropneumothorax. Endorses decreased appetite and PO intake, JIMENEZ, and cough. Denies fevers. States he is on chemo every 4 weeks. Nonsmoker.    10/1: Patient seen, no CP, EF now 25%, and going with interventional cardio for drainage  (01 Oct 2024 11:29)    Pt brought to cath lab for pericardiocentesis   S/P  600cc's straw colored fluid drained from pt.    10/2 Pt seen and examined in bed. Denies CP, SOB. Pericardial drain with 100 ml of straw color drainage overnight    REVIEW OF SYSTEMS:  All other review of systems is negative unless indicated above    PHYSICAL EXAM:  Constitutional: NAD  Neck: Soft and supple, No LAD, No JVD  Respiratory: Breath sounds are clear bilaterally, No wheezing, rales or rhonchi  Cardiovascular: S1 and S2, regular rate and rhythm,  Gastrointestinal: Bowel Sounds present, soft, nontender, nondistended, no guarding, no rebound  Extremities: No peripheral edema  Vascular: 2+ peripheral pulses  Neurological: A/O x 3, no focal deficits  Musculoskeletal: 5/5 strength b/l upper and lower extremities    Vital Signs Last 24 Hrs  T(C): 37.4 (01 Oct 2024 20:03), Max: 37.4 (01 Oct 2024 20:03)  T(F): 99.3 (01 Oct 2024 20:03), Max: 99.3 (01 Oct 2024 20:03)  HR: 108 (02 Oct 2024 06:00) (96 - 119)  BP: 126/75 (02 Oct 2024 06:00) (114/80 - 164/87)  BP(mean): 90 (02 Oct 2024 06:00) (73 - 114)  RR: 23 (02 Oct 2024 06:00) (14 - 33)  SpO2: 97% (02 Oct 2024 06:00) (95% - 100%)    Parameters below as of 02 Oct 2024 06:00  Patient On (Oxygen Delivery Method): room air        LABS: All Labs Reviewed:                        7.8    6.35  )-----------( 84       ( 02 Oct 2024 05:35 )             24.5     10-02    136  |  104  |  17  ----------------------------<  97  3.6   |  24  |  1.24    Ca    7.9[L]      02 Oct 2024 05:35  Phos  2.9     10-02  Mg     1.9     10-02    TPro  7.2  /  Alb  3.0[L]  /  TBili  0.4  /  DBili  x   /  AST  106[H]  /  ALT  92[H]  /  AlkPhos  157[H]  09-30    PT/INR - ( 30 Sep 2024 22:24 )   PT: 13.9 sec;   INR: 1.21 ratio         PTT - ( 30 Sep 2024 22:24 )  PTT:26.2 sec      Blood Culture: 10-01 @ 15:53  Organism --  Gram Stain Blood -- Gram Stain   No polymorphonuclear leukocytes seen per low power field  No organisms seen per oil power field  Specimen Source Body Fluid  Culture-Blood --    10-01 @ 15:00  Organism --  Gram Stain Blood -- Gram Stain --  Specimen Source Body Fluid  Culture-Blood --      10-01 @ 15:53  Organism --  Gram Stain Blood -- Gram Stain   No polymorphonuclear leukocytes seen per low power field  No organisms seen per oil power field  Specimen Source Body Fluid  Culture-Blood --    10-01 @ 15:00  Organism --  Gram Stain Blood -- Gram Stain --  Specimen Source Body Fluid  Culture-Blood --          RADIOLOGY:  < from: Xray Chest 1 View- PORTABLE-Urgent (09.30.24 @ 22:24) >  Cardiomegaly correlating with large pericardial effusion on subsequent   chest CT.  Trace right pleural effusion.  Recommend echocardiogram.  Left lower lobe retrocardiac opacity.    < end of copied text >    ECHOCARDIOGRAM:  < from: TTE W or WO Ultrasound Enhancing Agent (10.01.24 @ 07:43) >  CONCLUSIONS:      1. Left ventricular systolic function is severely decreased with an ejection fraction of 20 %.   2. Small right ventricular cavity size and normal right ventricular systolic function.   3. There is a large pericardial effusion with with early tamponade physiology. This was supported by evidence of, greater than 30% respiratory variation across the mitral valve E wave and diastolic collapse of the right atrium that exceeds 1/3 of the cardiac cycle. The pericardium appears markedly thickened.    < end of copied text >      < from: TTE Limited W or WO Ultrasound Enhancing Agent (10.01.24 @ 14:43) >  CONCLUSIONS:  1. Limited echo during the pericardiocentesis procedure. Post-pericardiocentesis: There is a trace pericardial effusion.    ________________________________________________________________________________________  FINDINGS:     Pericardium:  Post-pericardiocentesis: There is a trace pericardial effusion.    < end of copied text >          MEDICATIONS:  MEDICATIONS  (STANDING):  influenza   Vaccine 0.5 milliLiter(s) IntraMuscular once  lactated ringers. 1000 milliLiter(s) (125 mL/Hr) IV Continuous <Continuous>    TELEMETRY REVIEW: ST          ASSESSMENT AND PLAN:   53 y/o Male with a PMH of chronic neck pain, HTN, H pylori ulcer, metastatic SCLC lung CA on chemo and radiation last treatment 3 weeks ago presents to the ED c/o chest pain and SOB x2 weeks and low hgb. Pt seen by [facility] and had CT which showed large pericardial effusion pressing on his R ventricle and small L hydropneumothorax. Endorses decreased appetite and PO intake, JIMENEZ, and cough. Denies fevers. States he is on chemo every 4 weeks. Nonsmoker.    10/1: Patient seen, no CP, EF now 25%, and going with interventional cardio for drainage  (01 Oct 2024 11:29)      S/P Pericardiocentesis  on 10/1/24. Drain with 100 ml of straw color drainage    PLAN:  -Drain to gravity  -Limited TTE this AM to evaluate pericardial effusion  -Re evaluate for possible removal after TTE  -Monitor closely for decompensation.   -Plan discussed with patient   -Remainder of care to be addressed by primary team.              CHIEF COMPLAINT/DIAGNOSIS:    HPI:55 y/o Male with a PMH of chronic neck pain, HTN, H pylori ulcer, metastatic SCLC lung CA on chemo and radiation last treatment 3 weeks ago presents to the ED c/o chest pain and SOB x2 weeks and low hgb. Pt seen by [facility] and had CT which showed large pericardial effusion pressing on his R ventricle and small L hydropneumothorax. Endorses decreased appetite and PO intake, JIMENEZ, and cough. Denies fevers. States he is on chemo every 4 weeks. Nonsmoker.    10/1: Patient seen, no CP, EF now 25%, and going with interventional cardio for drainage  (01 Oct 2024 11:29)    Pt brought to cath lab for pericardiocentesis   S/P  600cc's straw colored fluid drained from pt.    10/2 Pt seen and examined in bed. Denies CP, SOB. Pericardial drain with 100 ml of straw color drainage overnight    REVIEW OF SYSTEMS:  All other review of systems is negative unless indicated above    PHYSICAL EXAM:  Constitutional: NAD  Neck: Soft and supple, No LAD, No JVD  Respiratory: Breath sounds are clear bilaterally, No wheezing, rales or rhonchi  Cardiovascular: S1 and S2, regular rate and rhythm,  Gastrointestinal: Bowel Sounds present, soft, nontender, nondistended, no guarding, no rebound  Extremities: No peripheral edema  Vascular: 2+ peripheral pulses  Neurological: A/O x 3, no focal deficits  Musculoskeletal: 5/5 strength b/l upper and lower extremities    Vital Signs Last 24 Hrs  T(C): 37.4 (01 Oct 2024 20:03), Max: 37.4 (01 Oct 2024 20:03)  T(F): 99.3 (01 Oct 2024 20:03), Max: 99.3 (01 Oct 2024 20:03)  HR: 108 (02 Oct 2024 06:00) (96 - 119)  BP: 126/75 (02 Oct 2024 06:00) (114/80 - 164/87)  BP(mean): 90 (02 Oct 2024 06:00) (73 - 114)  RR: 23 (02 Oct 2024 06:00) (14 - 33)  SpO2: 97% (02 Oct 2024 06:00) (95% - 100%)    Parameters below as of 02 Oct 2024 06:00  Patient On (Oxygen Delivery Method): room air        LABS: All Labs Reviewed:                        7.8    6.35  )-----------( 84       ( 02 Oct 2024 05:35 )             24.5     10-02    136  |  104  |  17  ----------------------------<  97  3.6   |  24  |  1.24    Ca    7.9[L]      02 Oct 2024 05:35  Phos  2.9     10-02  Mg     1.9     10-02    TPro  7.2  /  Alb  3.0[L]  /  TBili  0.4  /  DBili  x   /  AST  106[H]  /  ALT  92[H]  /  AlkPhos  157[H]  09-30    PT/INR - ( 30 Sep 2024 22:24 )   PT: 13.9 sec;   INR: 1.21 ratio         PTT - ( 30 Sep 2024 22:24 )  PTT:26.2 sec      Blood Culture: 10-01 @ 15:53  Organism --  Gram Stain Blood -- Gram Stain   No polymorphonuclear leukocytes seen per low power field  No organisms seen per oil power field  Specimen Source Body Fluid  Culture-Blood --    10-01 @ 15:00  Organism --  Gram Stain Blood -- Gram Stain --  Specimen Source Body Fluid  Culture-Blood --      10-01 @ 15:53  Organism --  Gram Stain Blood -- Gram Stain   No polymorphonuclear leukocytes seen per low power field  No organisms seen per oil power field  Specimen Source Body Fluid  Culture-Blood --    10-01 @ 15:00  Organism --  Gram Stain Blood -- Gram Stain --  Specimen Source Body Fluid  Culture-Blood --          RADIOLOGY:  < from: Xray Chest 1 View- PORTABLE-Urgent (09.30.24 @ 22:24) >  Cardiomegaly correlating with large pericardial effusion on subsequent   chest CT.  Trace right pleural effusion.  Recommend echocardiogram.  Left lower lobe retrocardiac opacity.    < end of copied text >    ECHOCARDIOGRAM:  < from: TTE W or WO Ultrasound Enhancing Agent (10.01.24 @ 07:43) >  CONCLUSIONS:      1. Left ventricular systolic function is severely decreased with an ejection fraction of 20 %.   2. Small right ventricular cavity size and normal right ventricular systolic function.   3. There is a large pericardial effusion with with early tamponade physiology. This was supported by evidence of, greater than 30% respiratory variation across the mitral valve E wave and diastolic collapse of the right atrium that exceeds 1/3 of the cardiac cycle. The pericardium appears markedly thickened.    < end of copied text >      < from: TTE Limited W or WO Ultrasound Enhancing Agent (10.01.24 @ 14:43) >  CONCLUSIONS:  1. Limited echo during the pericardiocentesis procedure. Post-pericardiocentesis: There is a trace pericardial effusion.    ________________________________________________________________________________________  FINDINGS:     Pericardium:  Post-pericardiocentesis: There is a trace pericardial effusion.    < end of copied text >          MEDICATIONS:  MEDICATIONS  (STANDING):  influenza   Vaccine 0.5 milliLiter(s) IntraMuscular once  lactated ringers. 1000 milliLiter(s) (125 mL/Hr) IV Continuous <Continuous>    TELEMETRY REVIEW: ST          ASSESSMENT AND PLAN:   55 y/o Male with a PMH of chronic neck pain, HTN, H pylori ulcer, metastatic SCLC lung CA on chemo and radiation last treatment 3 weeks ago presents to the ED c/o chest pain and SOB x2 weeks and low hgb. Pt seen by [facility] and had CT which showed large pericardial effusion pressing on his R ventricle and small L hydropneumothorax. Endorses decreased appetite and PO intake, JIMENEZ, and cough. Denies fevers. States he is on chemo every 4 weeks. Nonsmoker.    10/1: Patient seen, no CP, EF now 25%, and going with interventional cardio for drainage  (01 Oct 2024 11:29)      S/P Pericardiocentesis  on 10/1/24. Drain with 100 ml of straw color drainage    PLAN:  -Drain to gravity  -Limited TTE this AM to evaluate pericardial effusion  -Re evaluate for possible removal after TTE  -Monitor closely for decompensation.   -Plan discussed with patient and Dr Rodrigues  -Remainder of care to be addressed by primary team.              CHIEF COMPLAINT/DIAGNOSIS:    HPI:55 y/o Male with a PMH of chronic neck pain, HTN, H pylori ulcer, metastatic SCLC lung CA on chemo and radiation last treatment 3 weeks ago presents to the ED c/o chest pain and SOB x2 weeks and low hgb. Pt seen by [facility] and had CT which showed large pericardial effusion pressing on his R ventricle and small L hydropneumothorax. Endorses decreased appetite and PO intake, JIMENEZ, and cough. Denies fevers. States he is on chemo every 4 weeks. Nonsmoker.    10/1: Patient seen, no CP, EF now 25%, and going with interventional cardio for drainage  (01 Oct 2024 11:29)    Pt brought to cath lab for pericardiocentesis   S/P  600cc's straw colored fluid drained from pt.    10/2 Pt seen and examined in bed. Denies CP, SOB. Pericardial drain with 100 ml of straw color drainage overnight    REVIEW OF SYSTEMS:  All other review of systems is negative unless indicated above    PHYSICAL EXAM:  Constitutional: NAD  Neck: Soft and supple, No LAD, No JVD  Respiratory: Breath sounds are clear bilaterally, No wheezing, rales or rhonchi  Cardiovascular: S1 and S2, regular rate and rhythm,  Gastrointestinal: Bowel Sounds present, soft, nontender, nondistended, no guarding, no rebound  Extremities: No peripheral edema  Vascular: 2+ peripheral pulses  Neurological: A/O x 3, no focal deficits  Musculoskeletal: 5/5 strength b/l upper and lower extremities    Vital Signs Last 24 Hrs  T(C): 37.4 (01 Oct 2024 20:03), Max: 37.4 (01 Oct 2024 20:03)  T(F): 99.3 (01 Oct 2024 20:03), Max: 99.3 (01 Oct 2024 20:03)  HR: 108 (02 Oct 2024 06:00) (96 - 119)  BP: 126/75 (02 Oct 2024 06:00) (114/80 - 164/87)  BP(mean): 90 (02 Oct 2024 06:00) (73 - 114)  RR: 23 (02 Oct 2024 06:00) (14 - 33)  SpO2: 97% (02 Oct 2024 06:00) (95% - 100%)    Parameters below as of 02 Oct 2024 06:00  Patient On (Oxygen Delivery Method): room air        LABS: All Labs Reviewed:                        7.8    6.35  )-----------( 84       ( 02 Oct 2024 05:35 )             24.5     10-02    136  |  104  |  17  ----------------------------<  97  3.6   |  24  |  1.24    Ca    7.9[L]      02 Oct 2024 05:35  Phos  2.9     10-02  Mg     1.9     10-02    TPro  7.2  /  Alb  3.0[L]  /  TBili  0.4  /  DBili  x   /  AST  106[H]  /  ALT  92[H]  /  AlkPhos  157[H]  09-30    PT/INR - ( 30 Sep 2024 22:24 )   PT: 13.9 sec;   INR: 1.21 ratio         PTT - ( 30 Sep 2024 22:24 )  PTT:26.2 sec      Blood Culture: 10-01 @ 15:53  Organism --  Gram Stain Blood -- Gram Stain   No polymorphonuclear leukocytes seen per low power field  No organisms seen per oil power field  Specimen Source Body Fluid  Culture-Blood --    10-01 @ 15:00  Organism --  Gram Stain Blood -- Gram Stain --  Specimen Source Body Fluid  Culture-Blood --      10-01 @ 15:53  Organism --  Gram Stain Blood -- Gram Stain   No polymorphonuclear leukocytes seen per low power field  No organisms seen per oil power field  Specimen Source Body Fluid  Culture-Blood --    10-01 @ 15:00  Organism --  Gram Stain Blood -- Gram Stain --  Specimen Source Body Fluid  Culture-Blood --          RADIOLOGY:  < from: Xray Chest 1 View- PORTABLE-Urgent (09.30.24 @ 22:24) >  Cardiomegaly correlating with large pericardial effusion on subsequent   chest CT.  Trace right pleural effusion.  Recommend echocardiogram.  Left lower lobe retrocardiac opacity.    < end of copied text >    ECHOCARDIOGRAM:  < from: TTE W or WO Ultrasound Enhancing Agent (10.01.24 @ 07:43) >  CONCLUSIONS:      1. Left ventricular systolic function is severely decreased with an ejection fraction of 20 %.   2. Small right ventricular cavity size and normal right ventricular systolic function.   3. There is a large pericardial effusion with with early tamponade physiology. This was supported by evidence of, greater than 30% respiratory variation across the mitral valve E wave and diastolic collapse of the right atrium that exceeds 1/3 of the cardiac cycle. The pericardium appears markedly thickened.    < end of copied text >      < from: TTE Limited W or WO Ultrasound Enhancing Agent (10.01.24 @ 14:43) >  CONCLUSIONS:  1. Limited echo during the pericardiocentesis procedure. Post-pericardiocentesis: There is a trace pericardial effusion.    ________________________________________________________________________________________  FINDINGS:     Pericardium:  Post-pericardiocentesis: There is a trace pericardial effusion.    < end of copied text >          MEDICATIONS:  MEDICATIONS  (STANDING):  influenza   Vaccine 0.5 milliLiter(s) IntraMuscular once  lactated ringers. 1000 milliLiter(s) (125 mL/Hr) IV Continuous <Continuous>    TELEMETRY REVIEW: ST          ASSESSMENT AND PLAN:   55 y/o Male with a PMH of chronic neck pain, HTN, H pylori ulcer, metastatic SCLC lung CA on chemo and radiation last treatment 3 weeks ago presents to the ED c/o chest pain and SOB x2 weeks and low hgb. Pt seen by [facility] and had CT which showed large pericardial effusion pressing on his R ventricle and small L hydropneumothorax. Endorses decreased appetite and PO intake, JIMENEZ, and cough. Denies fevers. States he is on chemo every 4 weeks. Nonsmoker.    10/1: Patient seen, no CP, EF now 25%, and going with interventional cardio for drainage  (01 Oct 2024 11:29)      S/P Pericardiocentesis  on 10/1/24. Drain with 100 ml of straw color drainage  Scheduled for ischemic evaluation d/t reduced EF    PLAN:  -Drain to gravity  -Limited TTE this AM to evaluate pericardial effusion  -Re evaluate for possible removal after TTE- Reevaluate output in AM   -Monitor closely for decompensation.   -Plan discussed with patient and Dr Rodrigues  -Remainder of care to be addressed by primary team.

## 2024-10-02 NOTE — PROGRESS NOTE ADULT - SUBJECTIVE AND OBJECTIVE BOX
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 Pt seen by [facility] and had CT which showed large pericardial effusion pressing on his R ventricle and small L hydropneumothorax. Endorses decreased appetite and PO intake, JIMENEZ, and cough. Denies fevers. States he is on chemo every 4 weeks. Nonsmoker.    10/1: Patient seen, no CP, EF now 25%, and going with interventional cardio for drainage  (01 Oct 2024 11:29)      PAST MEDICAL & SURGICAL HISTORY:  H pylori ulcer      HTN (hypertension)      Chronic neck pain          SOCIAL HISTORY:    FAMILY HISTORY:      ALLERGIES:  Allergies    No Known Allergies    Intolerances        MEDICATIONS:    MEDICATIONS  (STANDING):  influenza   Vaccine 0.5 milliLiter(s) IntraMuscular once  lactated ringers. 1000 milliLiter(s) (125 mL/Hr) IV Continuous <Continuous>    MEDICATIONS  (PRN):      REVIEW OF SYSTEMS:    CONSTITUTIONAL: No weakness, fevers or chills  EYES/ENT: No visual changes;  No vertigo or throat pain   NECK: No pain or stiffness  RESPIRATORY: No cough, wheezing, hemoptysis; No shortness of breath  CARDIOVASCULAR: No chest pain or palpitations  GASTROINTESTINAL: No abdominal or epigastric pain. No nausea, vomiting, or hematemesis; No diarrhea or constipation. No melena or hematochezia.  GENITOURINARY: No dysuria, frequency or hematuria  NEUROLOGICAL: No numbness or weakness  SKIN: No itching, burning, rashes, or lesions   All other review of systems is negative unless indicated above    Vital Signs Last 24 Hrs  T(C): 37.4 (01 Oct 2024 20:03), Max: 37.4 (01 Oct 2024 20:03)  T(F): 99.3 (01 Oct 2024 20:03), Max: 99.3 (01 Oct 2024 20:03)  HR: 108 (02 Oct 2024 06:00) (99 - 119)  BP: 126/75 (02 Oct 2024 06:00) (114/80 - 164/87)  BP(mean): 90 (02 Oct 2024 06:00) (73 - 114)  RR: 23 (02 Oct 2024 06:00) (14 - 33)  SpO2: 97% (02 Oct 2024 06:00) (95% - 100%)    Parameters below as of 02 Oct 2024 06:00  Patient On (Oxygen Delivery Method): room air    Daily     Daily I&O's Summary    01 Oct 2024 07:01  -  02 Oct 2024 07:00  --------------------------------------------------------  IN: 1128 mL / OUT: 2790 mL / NET: -1662 mL        PHYSICAL EXAM:    Constitutional: NAD, awake and alert, well-developed  HEENT: PERR, EOMI,  No oral cyananosis.  Neck:  supple,  No JVD  Respiratory: Breath sounds are clear bilaterally, No wheezing, rales or rhonchi  Cardiovascular: S1 and S2, regular rate and rhythm, no Murmurs, gallops or rubs  Gastrointestinal: Bowel Sounds present, soft, nontender.   Extremities: No peripheral edema. No clubbing or cyanosis.  Vascular: 2+ peripheral pulses  Neurological: A/O x 3, no focal deficits  Musculoskeletal: no calf tenderness.  Skin: No rashes.      LABS: All Labs Reviewed:                        7.8    6.35  )-----------( 84       ( 02 Oct 2024 05:35 )             24.5                         7.6    4.14  )-----------( 31       ( 30 Sep 2024 22:24 )             24.8     02 Oct 2024 05:35    136    |  104    |  17     ----------------------------<  97     3.6     |  24     |  1.24   30 Sep 2024 22:24    135    |  105    |  20     ----------------------------<  134    4.4     |  21     |  1.41     Ca    7.9        02 Oct 2024 05:35  Ca    8.5        30 Sep 2024 22:24  Phos  2.9       02 Oct 2024 05:35  Mg     1.9       02 Oct 2024 05:35    TPro  7.2    /  Alb  3.0    /  TBili  0.4    /  DBili  x      /  AST  106    /  ALT  92     /  AlkPhos  157    30 Sep 2024 22:24    PT/INR - ( 30 Sep 2024 22:24 )   PT: 13.9 sec;   INR: 1.21 ratio         PTT - ( 30 Sep 2024 22:24 )  PTT:26.2 sec      Blood Culture: Organism --  Gram Stain Blood -- Gram Stain   No polymorphonuclear leukocytes seen per low power field  No organisms seen per oil power field  Specimen Source Body Fluid  Culture-Blood --    Organism --  Gram Stain Blood -- Gram Stain --  Specimen Source Body Fluid  Culture-Blood --            RADIOLOGY/EKG:      ECHO/CARDIAC CATHTERIZATION/STRESS TEST:   PCP:    REQUESTING PHYSICIAN:    REASON FOR CONSULT:    CHIEF COMPLAINT:    HPI:  53 y/o Male with a PMH of chronic neck pain, HTN, H pylori ulcer, metastatic SCLC lung CA on chemo and radiation last treatment 3 weeks ago presents to the ED c/o chest pain and SOB x2 weeks and low hgb. Pt seen by [facility] and had CT which showed large pericardial effusion pressing on his R ventricle and small L hydropneumothorax. Endorses decreased appetite and PO intake, JIMENEZ, and cough. Denies fevers. States he is on chemo every 4 weeks. Nonsmoker.    10/1: Patient seen, no CP, EF now 25%, and going with interventional cardio for drainage  (01 Oct 2024 11:29)  10/2: Pt feels improved. No chest pain.      PAST MEDICAL & SURGICAL HISTORY:  H pylori ulcer      HTN (hypertension)      Chronic neck pain          SOCIAL HISTORY:    FAMILY HISTORY:      ALLERGIES:  Allergies    No Known Allergies    Intolerances        MEDICATIONS:    MEDICATIONS  (STANDING):  influenza   Vaccine 0.5 milliLiter(s) IntraMuscular once  lactated ringers. 1000 milliLiter(s) (125 mL/Hr) IV Continuous <Continuous>    MEDICATIONS  (PRN):      REVIEW OF SYSTEMS:    CONSTITUTIONAL: No weakness, fevers or chills  EYES/ENT: No visual changes;  No vertigo or throat pain   NECK: No pain or stiffness  RESPIRATORY: No cough, wheezing, hemoptysis; No shortness of breath  CARDIOVASCULAR: No chest pain or palpitations  GASTROINTESTINAL: No abdominal or epigastric pain. No nausea, vomiting, or hematemesis; No diarrhea or constipation. No melena or hematochezia.  GENITOURINARY: No dysuria, frequency or hematuria  NEUROLOGICAL: No numbness or weakness  SKIN: No itching, burning, rashes, or lesions   All other review of systems is negative unless indicated above    Vital Signs Last 24 Hrs  T(C): 37.4 (01 Oct 2024 20:03), Max: 37.4 (01 Oct 2024 20:03)  T(F): 99.3 (01 Oct 2024 20:03), Max: 99.3 (01 Oct 2024 20:03)  HR: 108 (02 Oct 2024 06:00) (99 - 119)  BP: 126/75 (02 Oct 2024 06:00) (114/80 - 164/87)  BP(mean): 90 (02 Oct 2024 06:00) (73 - 114)  RR: 23 (02 Oct 2024 06:00) (14 - 33)  SpO2: 97% (02 Oct 2024 06:00) (95% - 100%)    Parameters below as of 02 Oct 2024 06:00  Patient On (Oxygen Delivery Method): room air    Daily     Daily I&O's Summary    01 Oct 2024 07:01  -  02 Oct 2024 07:00  --------------------------------------------------------  IN: 1128 mL / OUT: 2790 mL / NET: -1662 mL        PHYSICAL EXAM:    Constitutional: NAD, awake and alert, well-developed  HEENT: PERR, EOMI,  No oral cyananosis.  Neck:  supple,  No JVD  Respiratory: Breath sounds are clear bilaterally, No wheezing, rales or rhonchi  Cardiovascular: S1 and S2, regular rate and rhythm, no Murmurs, gallops or rubs  Gastrointestinal: Bowel Sounds present, soft, nontender.   Extremities: No peripheral edema. No clubbing or cyanosis.  Vascular: 2+ peripheral pulses  Neurological: A/O x 3, no focal deficits  Musculoskeletal: no calf tenderness.  Skin: No rashes.      LABS: All Labs Reviewed:                        7.8    6.35  )-----------( 84       ( 02 Oct 2024 05:35 )             24.5                         7.6    4.14  )-----------( 31       ( 30 Sep 2024 22:24 )             24.8     02 Oct 2024 05:35    136    |  104    |  17     ----------------------------<  97     3.6     |  24     |  1.24   30 Sep 2024 22:24    135    |  105    |  20     ----------------------------<  134    4.4     |  21     |  1.41     Ca    7.9        02 Oct 2024 05:35  Ca    8.5        30 Sep 2024 22:24  Phos  2.9       02 Oct 2024 05:35  Mg     1.9       02 Oct 2024 05:35    TPro  7.2    /  Alb  3.0    /  TBili  0.4    /  DBili  x      /  AST  106    /  ALT  92     /  AlkPhos  157    30 Sep 2024 22:24    PT/INR - ( 30 Sep 2024 22:24 )   PT: 13.9 sec;   INR: 1.21 ratio         PTT - ( 30 Sep 2024 22:24 )  PTT:26.2 sec      Blood Culture: Organism --  Gram Stain Blood -- Gram Stain   No polymorphonuclear leukocytes seen per low power field  No organisms seen per oil power field  Specimen Source Body Fluid  Culture-Blood --    Organism --  Gram Stain Blood -- Gram Stain --  Specimen Source Body Fluid  Culture-Blood --            RADIOLOGY/EKG:      ECHO/CARDIAC CATHTERIZATION/STRESS TEST:

## 2024-10-02 NOTE — BRIEF OPERATIVE NOTE - NSICDXBRIEFPOSTOP_GEN_ALL_CORE_FT
POST-OP DIAGNOSIS:  Pericardial effusion 01-Oct-2024 19:42:02 malignant vs inflammatory Karrie Rosas  
POST-OP DIAGNOSIS:  Nonobstructive atherosclerosis of coronary artery 02-Oct-2024 13:27:22  Johanna Huerta

## 2024-10-03 LAB
ANION GAP SERPL CALC-SCNC: 8 MMOL/L — SIGNIFICANT CHANGE UP (ref 5–17)
BUN SERPL-MCNC: 21 MG/DL — SIGNIFICANT CHANGE UP (ref 7–23)
CALCIUM SERPL-MCNC: 8 MG/DL — LOW (ref 8.5–10.1)
CHLORIDE SERPL-SCNC: 106 MMOL/L — SIGNIFICANT CHANGE UP (ref 96–108)
CO2 SERPL-SCNC: 23 MMOL/L — SIGNIFICANT CHANGE UP (ref 22–31)
CREAT SERPL-MCNC: 1.17 MG/DL — SIGNIFICANT CHANGE UP (ref 0.5–1.3)
EGFR: 75 ML/MIN/1.73M2 — SIGNIFICANT CHANGE UP
GLUCOSE SERPL-MCNC: 113 MG/DL — HIGH (ref 70–99)
HCT VFR BLD CALC: 28.6 % — LOW (ref 39–50)
HGB BLD-MCNC: 9 G/DL — LOW (ref 13–17)
MAGNESIUM SERPL-MCNC: 2 MG/DL — SIGNIFICANT CHANGE UP (ref 1.6–2.6)
MCHC RBC-ENTMCNC: 31.3 PG — SIGNIFICANT CHANGE UP (ref 27–34)
MCHC RBC-ENTMCNC: 31.5 GM/DL — LOW (ref 32–36)
MCV RBC AUTO: 99.3 FL — SIGNIFICANT CHANGE UP (ref 80–100)
PHOSPHATE SERPL-MCNC: 3.6 MG/DL — SIGNIFICANT CHANGE UP (ref 2.5–4.5)
PLATELET # BLD AUTO: 78 K/UL — LOW (ref 150–400)
POTASSIUM SERPL-MCNC: 4.3 MMOL/L — SIGNIFICANT CHANGE UP (ref 3.5–5.3)
POTASSIUM SERPL-SCNC: 4.3 MMOL/L — SIGNIFICANT CHANGE UP (ref 3.5–5.3)
RBC # BLD: 2.88 M/UL — LOW (ref 4.2–5.8)
RBC # FLD: 18.1 % — HIGH (ref 10.3–14.5)
SODIUM SERPL-SCNC: 137 MMOL/L — SIGNIFICANT CHANGE UP (ref 135–145)
WBC # BLD: 4.54 K/UL — SIGNIFICANT CHANGE UP (ref 3.8–10.5)
WBC # FLD AUTO: 4.54 K/UL — SIGNIFICANT CHANGE UP (ref 3.8–10.5)

## 2024-10-03 PROCEDURE — 99291 CRITICAL CARE FIRST HOUR: CPT

## 2024-10-03 PROCEDURE — 99231 SBSQ HOSP IP/OBS SF/LOW 25: CPT

## 2024-10-03 PROCEDURE — 99233 SBSQ HOSP IP/OBS HIGH 50: CPT

## 2024-10-03 RX ORDER — METOCLOPRAMIDE HCL 5 MG
10 TABLET ORAL
Refills: 0 | Status: DISCONTINUED | OUTPATIENT
Start: 2024-10-03 | End: 2024-10-04

## 2024-10-03 RX ORDER — OSIMERTINIB 40 1/1
2 TABLET, FILM COATED ORAL
Refills: 0 | DISCHARGE

## 2024-10-03 RX ADMIN — SACUBITRIL AND VALSARTAN 1 TABLET(S): 97; 103 TABLET, FILM COATED ORAL at 11:14

## 2024-10-03 RX ADMIN — SACUBITRIL AND VALSARTAN 1 TABLET(S): 97; 103 TABLET, FILM COATED ORAL at 21:18

## 2024-10-03 NOTE — PROGRESS NOTE ADULT - SUBJECTIVE AND OBJECTIVE BOX
INTERVAL HPI/OVERNIGHT EVENTS:  Patient S&E at bedside. No o/n events, patient resting comfortably.   seen with spouse at bedside     VITAL SIGNS:  T(F): 99.2 (10-03-24 @ 16:00)  HR: 114 (10-03-24 @ 18:00)  BP: 121/81 (10-03-24 @ 18:00)  RR: 26 (10-03-24 @ 18:00)  SpO2: 97% (10-03-24 @ 18:00)  Wt(kg): --    PHYSICAL EXAM:    Constitutional: NAD  Eyes: EOMI, sclera non-icteric  Neck: supple, no masses, no JVD  Respiratory: CTA b/l, good air entry b/l  Cardiovascular: RRR, no M/R/G, pericardial drain noted.   Gastrointestinal: soft, NTND, no masses palpable, + BS, no hepatosplenomegaly  Extremities: no c/c/e  Neurological: AAOx3      MEDICATIONS  (STANDING):  influenza   Vaccine 0.5 milliLiter(s) IntraMuscular once  sacubitril 24 mG/valsartan 26 mG 1 Tablet(s) Oral two times a day    MEDICATIONS  (PRN):  metoclopramide 10 milliGRAM(s) Oral Before meals and at bedtime PRN for nausea      Allergies    No Known Allergies    Intolerances        LABS:                        9.0    4.54  )-----------( 78       ( 03 Oct 2024 05:49 )             28.6     10-03    137  |  106  |  21  ----------------------------<  113[H]  4.3   |  23  |  1.17    Ca    8.0[L]      03 Oct 2024 05:49  Phos  3.6     10-03  Mg     2.0     10-03        Urinalysis Basic - ( 03 Oct 2024 05:49 )    Color: x / Appearance: x / SG: x / pH: x  Gluc: 113 mg/dL / Ketone: x  / Bili: x / Urobili: x   Blood: x / Protein: x / Nitrite: x   Leuk Esterase: x / RBC: x / WBC x   Sq Epi: x / Non Sq Epi: x / Bacteria: x        RADIOLOGY & ADDITIONAL TESTS:  Studies reviewed.    ASSESSMENT & PLAN:

## 2024-10-03 NOTE — PROGRESS NOTE ADULT - SUBJECTIVE AND OBJECTIVE BOX
REASON FOR VISIT: CHF, Pericardial Effusion    HPI:  53 y/o man with a history of metastatic small-cell lung cancer and HTN admitted on 10/1/2024 with dyspnea and large pericardial effusion on outpatient imaging.    10/1: Patient seen, no CP, EF now 25%, and going with interventional cardio for drainage  (01 Oct 2024 11:29)  10/2: Pt feels improved. No chest pain.  10/3/24: Comfortable; resolved dyspnea; no new complaints.    MEDICATIONS  (STANDING):  sacubitril 24 mG/valsartan 26 mG 1 Tablet(s) Oral two times a day    Vital Signs Last 24 Hrs  T(C): 37.1 (03 Oct 2024 05:00), Max: 37.3 (03 Oct 2024 00:34)  T(F): 98.7 (03 Oct 2024 05:00), Max: 99.1 (03 Oct 2024 00:34)  HR: 114 (03 Oct 2024 06:00) (102 - 118)  BP: 117/81 (03 Oct 2024 06:00) (102/81 - 129/88)  BP(mean): 90 (03 Oct 2024 06:00) (83 - 101)  RR: 26 (03 Oct 2024 06:00) (14 - 38)  SpO2: 96% (03 Oct 2024 06:00) (96% - 100%)    PHYSICAL EXAM:  Constitutional: NAD, awake and alert  Respiratory: Breath sounds are clear bilaterally, No wheezing, rales or rhonchi  Cardiovascular: S1 and S2, regular, mild tachycardia  Gastrointestinal: Bowel Sounds present, soft, nontender.   Extremities: No peripheral edema.    LABS:                     9.0    4.54  )-----------( 78       ( 03 Oct 2024 05:49 )             28.6     137  |  106  |  21  ----------------------------<  113[H]  4.3   |  23  |  1.17    Ca    8.0[L]      03 Oct 2024 05:49  Phos  3.6     10-03  Mg     2.0     10-03    TroponinI hsT: 29.03          TTE W or WO Ultrasound Enhancing Agent (10.01.24 @ 07:43) >   1. Left ventricular systolic function is severely decreased with an ejection fraction of 20 %.   2. Small right ventricular cavity size and normal right ventricular systolic function.   3. There is a large pericardial effusion with with early tamponade physiology. This was supported by evidence of, greater than 30% respiratory variation across the mitral valve E wave and diastolic collapse of the right atrium that exceeds 1/3 of the cardiac cycle. The pericardium appears markedly thickened.    TTE Limited W or WO Ultrasound Enhancing Agent (10.02.24 @ 08:14) >   1. Limited study to evaluate for pericardial effusion.   2. Small pericardial effusion    Cardiac Catheterization (10.02.24 @ 12:43): Minimal Non-Obstructive CAD, Normal LVEDP, Improved LVEF at 50%     12 Lead ECG (09.30.24 @ 20:01) >  Sinus tachycardia  Low voltage QRS  Prolonged QT  Nonspecific T wave abnormality  Abnormal ECG  No previous ECGs available

## 2024-10-03 NOTE — PROGRESS NOTE ADULT - NS ATTEND AMEND GEN_ALL_CORE FT
Medication was denied, expedited appeal wan initiated #[id number] 72 hour decision turn around  
Pt with large pericardial effusion with evidence of early tamponade. Persistently tachycardiac  Underwent pericardiocentesis with me with drainage of about 650cc of straw colored fluid. Pending studies and cytology  Also with reduced EF which require evaluation
Less than 50cc of drainage over the last 24 hours  Drain successfully removed  Limited echo in the am. If no significant effusion he can be discharged

## 2024-10-03 NOTE — PROGRESS NOTE ADULT - SUBJECTIVE AND OBJECTIVE BOX
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 Pt seen by [facility] and had CT which showed large pericardial effusion pressing on his R ventricle and small L hydropneumothorax. Endorses decreased appetite and PO intake, JIMENEZ, and cough. Denies fevers. States he is on chemo every 4 weeks. Nonsmoker.    10/1: Patient seen, no CP, EF now 25%, and going with interventional cardio for drainage   10/2: No events over night, 100 cc from pericardial drain, for possible cath today.  10/3 Case discussed on CCU rounds, pericardial drain removed, doing well.    ICU Vital Signs Last 24 Hrs  T(C): 37.3 (03 Oct 2024 16:00), Max: 37.3 (03 Oct 2024 00:34)  T(F): 99.2 (03 Oct 2024 16:00), Max: 99.2 (03 Oct 2024 16:00)  HR: 115 (03 Oct 2024 14:00) (106 - 118)  BP: 113/84 (03 Oct 2024 14:00) (102/81 - 126/82)  BP(mean): 92 (03 Oct 2024 14:00) (83 - 97)  RR: 29 (03 Oct 2024 14:00) (21 - 29)  SpO2: 98% (03 Oct 2024 13:00) (96% - 100%)                                9.0    4.54  )-----------( 78       ( 03 Oct 2024 05:49 )             28.6         10-03    137  |  106  |  21  ----------------------------<  113[H]  4.3   |  23  |  1.17    Ca    8.0[L]      03 Oct 2024 05:49  Phos  3.6     10-03  Mg     2.0     10-03

## 2024-10-03 NOTE — CHART NOTE - NSCHARTNOTEFT_GEN_A_CORE
10/3/24  s/p pericardiocentesis on 10/1/24, 610 cc's drained. Today <10cc's drained in 10hours. Drained pulled using aseptic technique. Pt tolerated well.  Will re evaluate with TTE in morning  Possible DC if no fluid accumulation.

## 2024-10-03 NOTE — PROGRESS NOTE ADULT - PROBLEM SELECTOR PLAN 3
Cardiomyopathy -- severe LV dysfunction on serial TTEs; continue Entresto; I recommend low dose metoprolol (metoprolol tartrate in 12.5 mg BID).

## 2024-10-03 NOTE — PROGRESS NOTE ADULT - SUBJECTIVE AND OBJECTIVE BOX
Cath Lab Nurse Practitioner Note  HPI:  53 y/o Male with a PMH of chronic neck pain, HTN, H pylori ulcer, metastatic SCLC lung CA on chemo and radiation last treatment 3 weeks ago presents to the ED c/o chest pain and SOB x2 weeks and low hgb. Pt seen by [facility] and had CT which showed large pericardial effusion pressing on his R ventricle and small L hydropneumothorax. Endorses decreased appetite and PO intake, JIMENEZ, and cough. Denies fevers. States he is on chemo every 4 weeks. Nonsmoker.    10/1: Patient seen, no CP, EF now 25%, and going with interventional cardio for drainage  (01 Oct 2024 11:29)  10/2 s/p LHC : revealing non obs disease, slightly improved EF   10/3 Pt resting comfortably in bed, NAD, denies SOB/CP. Drain set to gravity with cloudy, yellow fluid drainage has slowed. Pericardial fluid analysis reveals RBC 1000, malignancy can not be ruled out.      INTERVAL/OVERNIGHT EVENTS  Minimal drainage in LCW drain.     Vital Signs Last 24 Hrs  T(C): 36.9 (03 Oct 2024 09:53), Max: 37.3 (03 Oct 2024 00:34)  T(F): 98.4 (03 Oct 2024 09:53), Max: 99.1 (03 Oct 2024 00:34)  HR: 112 (03 Oct 2024 08:00) (102 - 118)  BP: 105/77 (03 Oct 2024 08:00) (102/81 - 129/88)  BP(mean): 87 (03 Oct 2024 08:00) (83 - 101)  RR: 25 (03 Oct 2024 08:00) (21 - 29)  SpO2: 96% (03 Oct 2024 06:00) (96% - 100%)        PHYSICAL EXAM:  NEURO: Non-focal, AxOx3.  No neuro deficits NECK: Supple, No JVD, No LAD  CHEST/LUNG: Clear to auscultation bilaterally; No wheeze  HEART: s1 s2 IRRR; No murmurs, rubs, or gallops  ABDOMEN: Soft, Nontender, Nondistended; Bowel sounds present X 4 quadrants   EXTREMITIES:  2+ Peripheral Pulses, No clubbing, cyanosis, or edema   VASCULAR: Peripheral pulses palpable 2+ bilaterally  PROCEDURE SITE: LCW with sutured drain to gravity ,Site is without hematoma or bleeding. Sensation and PEREZ intact. Distal pulses palpable 2+, capillary refill < 2 seconds. Patient denies pain, numbness, tingling, CP or SOB. Clean dry dressing applied    Labs:                        9.0    4.54  )-----------( 78       ( 03 Oct 2024 05:49 )             28.6     10-03    137  |  106  |  21  ----------------------------<  113[H]  4.3   |  23  |  1.17    Ca    8.0[L]      03 Oct 2024 05:49  Phos  3.6     10-03  Mg     2.0     10-03    Cell Count, Body Fluid (10.01.24 @ 15:46)    Mesothelial Cells - Body Fluid: 5 %   Tube Type: Lavendar   Fluid Type: Pericardial Fluid   Body Fluid Appearance: Clear   Color - Body Fluid: Yellow   Total Nucleated Cell Count, Body Fluid: 204: Reference Ranges:  Synovial Fluid  Total nucleated cells < 150 cells/uL  Neutrophils <25%  Lymphocytes 10-15%  Monocytes/Macrophages </=70%  Other parameters- No established reference ranges.  Bronchoalveolar lavage  Macrophages >85%  Lymphocytes 10-15%  Neutrophils <3%  Eosinophils <1%  Other parameters- No established reference ranges.  Peritoneal/pleural/pericardial fluid/other body fluid types  No established reference ranges. /uL   Total RBC Count: 1000 /uL   Neutrophils-Body Fluid: 2 %   BF Lymphocytes: 17 %   Atypical Lymphocytes - Body Fluid: 0 %   Monocyte/Macrophage Count - Body Fluid: 64 %   Eosinophil Count - Body Fluid: 0 %   Nucleated Red Blood Cells - Body Fluid: 0 %   Other Body Cells: 12 %        MEDICATIONS  (STANDING):  influenza   Vaccine 0.5 milliLiter(s) IntraMuscular once  sacubitril 24 mG/valsartan 26 mG 1 Tablet(s) Oral two times a day        Plan/recommendation   -Continue monitoring in CCU  -Will reassess late after and remove drain if minimal drainage  -Consider follow up limited TTE in morning to assess reaccumulation of pericardial effusion  -Discussed with Dr. Rodrigues  -Remainder of care to be addressed by primary team.

## 2024-10-03 NOTE — PROGRESS NOTE ADULT - SUBJECTIVE AND OBJECTIVE BOX
Subjective:  Pt seen, states breathing is better, sseen on RA    Vital Signs:  Vital Signs Last 24 Hrs  T(C): 37.1 (10-03-24 @ 05:00), Max: 37.3 (10-03-24 @ 00:34)  T(F): 98.7 (10-03-24 @ 05:00), Max: 99.1 (10-03-24 @ 00:34)  HR: 112 (10-03-24 @ 08:00) (102 - 118)  BP: 105/77 (10-03-24 @ 08:00) (102/81 - 129/88)  RR: 25 (10-03-24 @ 08:00) (21 - 38)  SpO2: 96% (10-03-24 @ 06:00) (96% - 100%) on (O2)    Telemetry/Alarms:    Relevant labs, radiology and Medications reviewed                        9.0    4.54  )-----------( 78       ( 03 Oct 2024 05:49 )             28.6     10-03    137  |  106  |  21  ----------------------------<  113[H]  4.3   |  23  |  1.17    Ca    8.0[L]      03 Oct 2024 05:49  Phos  3.6     10-03  Mg     2.0     10-03        MEDICATIONS  (STANDING):  influenza   Vaccine 0.5 milliLiter(s) IntraMuscular once  sacubitril 24 mG/valsartan 26 mG 1 Tablet(s) Oral two times a day    MEDICATIONS  (PRN):      Physical exam  Gen NAD  Neuro AAOx3  Card tachycardic  Pulm equal b/l  Abd soft  Ext warm, no edema    Tubes: pericardial drain to gravity, 70cc output last 24 hours.     I&O's Summary    02 Oct 2024 07:01  -  03 Oct 2024 07:00  --------------------------------------------------------  IN: 800 mL / OUT: 70 mL / NET: 730 mL        Assessment  54y Male  w/ PAST MEDICAL & SURGICAL HISTORY:  H pylori ulcer      HTN (hypertension)      Chronic neck pain      admitted with complaints of Patient is a 54y old  Male who presents with a chief complaint of pericardial process (02 Oct 2024 09:24)  .  55 y/o M PMHx of chronic neck pain, HTN, H pylori ulcer, metastatic SCLC lung CA on chemo and radiation last treatment 3 weeks ago presents to the ED c/o chest pain and SOB x2 weeks and low hgb. Pt seen by [facility] today, had CT which showed large pericardial effusion pressing on his R ventricle and L hydropneumothorax. Endorses decreased appetite and PO intake, JIMENEZ, and cough. Denies fevers. States he is on chemo every 4 weeks. Nonsmoker. 10/1 s/p Pericardiocentesis with Interventional cardiology Exudative. S/p left heart cath 10/2/24 nonobstructive dz    Plan   Cytology P    will cont to follow  Cont care as per primary team  ECHO post drainge w small pericardial effusion     Discussed with Cardiothoracic Team at AM rounds.

## 2024-10-03 NOTE — PROGRESS NOTE ADULT - RESPIRATORY
clear to auscultation bilaterally/no wheezes/no rales
airway patent/good air movement/respirations non-labored

## 2024-10-04 ENCOUNTER — RESULT REVIEW (OUTPATIENT)
Age: 54
End: 2024-10-04

## 2024-10-04 ENCOUNTER — TRANSCRIPTION ENCOUNTER (OUTPATIENT)
Age: 54
End: 2024-10-04

## 2024-10-04 VITALS — SYSTOLIC BLOOD PRESSURE: 113 MMHG | OXYGEN SATURATION: 98 % | HEART RATE: 112 BPM | DIASTOLIC BLOOD PRESSURE: 74 MMHG

## 2024-10-04 LAB
ALBUMIN SERPL ELPH-MCNC: 2.2 G/DL — LOW (ref 3.3–5)
ALP SERPL-CCNC: 130 U/L — HIGH (ref 40–120)
ALT FLD-CCNC: 39 U/L — SIGNIFICANT CHANGE UP (ref 12–78)
ANION GAP SERPL CALC-SCNC: 8 MMOL/L — SIGNIFICANT CHANGE UP (ref 5–17)
ANISOCYTOSIS BLD QL: SIGNIFICANT CHANGE UP
AST SERPL-CCNC: 30 U/L — SIGNIFICANT CHANGE UP (ref 15–37)
BASOPHILS # BLD AUTO: 0 K/UL — SIGNIFICANT CHANGE UP (ref 0–0.2)
BASOPHILS NFR BLD AUTO: 0 % — SIGNIFICANT CHANGE UP (ref 0–2)
BILIRUB SERPL-MCNC: 0.5 MG/DL — SIGNIFICANT CHANGE UP (ref 0.2–1.2)
BUN SERPL-MCNC: 20 MG/DL — SIGNIFICANT CHANGE UP (ref 7–23)
CALCIUM SERPL-MCNC: 8.2 MG/DL — LOW (ref 8.5–10.1)
CHLORIDE SERPL-SCNC: 105 MMOL/L — SIGNIFICANT CHANGE UP (ref 96–108)
CO2 SERPL-SCNC: 21 MMOL/L — LOW (ref 22–31)
CREAT SERPL-MCNC: 1.07 MG/DL — SIGNIFICANT CHANGE UP (ref 0.5–1.3)
EGFR: 83 ML/MIN/1.73M2 — SIGNIFICANT CHANGE UP
EOSINOPHIL # BLD AUTO: 0.05 K/UL — SIGNIFICANT CHANGE UP (ref 0–0.5)
EOSINOPHIL NFR BLD AUTO: 1 % — SIGNIFICANT CHANGE UP (ref 0–6)
GLUCOSE SERPL-MCNC: 107 MG/DL — HIGH (ref 70–99)
HCT VFR BLD CALC: 28.5 % — LOW (ref 39–50)
HGB BLD-MCNC: 8.9 G/DL — LOW (ref 13–17)
HYPOCHROMIA BLD QL: SLIGHT — SIGNIFICANT CHANGE UP
LYMPHOCYTES # BLD AUTO: 0.35 K/UL — LOW (ref 1–3.3)
LYMPHOCYTES # BLD AUTO: 7 % — LOW (ref 13–44)
MACROCYTES BLD QL: SLIGHT — SIGNIFICANT CHANGE UP
MAGNESIUM SERPL-MCNC: 2.1 MG/DL — SIGNIFICANT CHANGE UP (ref 1.6–2.6)
MANUAL SMEAR VERIFICATION: SIGNIFICANT CHANGE UP
MCHC RBC-ENTMCNC: 30.7 PG — SIGNIFICANT CHANGE UP (ref 27–34)
MCHC RBC-ENTMCNC: 31.2 GM/DL — LOW (ref 32–36)
MCV RBC AUTO: 98.3 FL — SIGNIFICANT CHANGE UP (ref 80–100)
MONOCYTES # BLD AUTO: 0.7 K/UL — SIGNIFICANT CHANGE UP (ref 0–0.9)
MONOCYTES NFR BLD AUTO: 14 % — SIGNIFICANT CHANGE UP (ref 2–14)
NEUTROPHILS # BLD AUTO: 3.91 K/UL — SIGNIFICANT CHANGE UP (ref 1.8–7.4)
NEUTROPHILS NFR BLD AUTO: 78 % — HIGH (ref 43–77)
NRBC # BLD: 0 /100 WBCS — SIGNIFICANT CHANGE UP (ref 0–0)
NRBC # BLD: SIGNIFICANT CHANGE UP /100 WBCS (ref 0–0)
PHOSPHATE SERPL-MCNC: 3.4 MG/DL — SIGNIFICANT CHANGE UP (ref 2.5–4.5)
PLAT MORPH BLD: NORMAL — SIGNIFICANT CHANGE UP
PLATELET # BLD AUTO: 84 K/UL — LOW (ref 150–400)
POLYCHROMASIA BLD QL SMEAR: SLIGHT — SIGNIFICANT CHANGE UP
POTASSIUM SERPL-MCNC: 4 MMOL/L — SIGNIFICANT CHANGE UP (ref 3.5–5.3)
POTASSIUM SERPL-SCNC: 4 MMOL/L — SIGNIFICANT CHANGE UP (ref 3.5–5.3)
PROT SERPL-MCNC: 6.2 GM/DL — SIGNIFICANT CHANGE UP (ref 6–8.3)
RBC # BLD: 2.9 M/UL — LOW (ref 4.2–5.8)
RBC # FLD: 18.3 % — HIGH (ref 10.3–14.5)
RBC BLD AUTO: ABNORMAL
SODIUM SERPL-SCNC: 134 MMOL/L — LOW (ref 135–145)
TOXIC GRANULES BLD QL SMEAR: PRESENT — SIGNIFICANT CHANGE UP
WBC # BLD: 5.01 K/UL — SIGNIFICANT CHANGE UP (ref 3.8–10.5)
WBC # FLD AUTO: 5.01 K/UL — SIGNIFICANT CHANGE UP (ref 3.8–10.5)

## 2024-10-04 PROCEDURE — 93308 TTE F-UP OR LMTD: CPT | Mod: 26

## 2024-10-04 PROCEDURE — 93321 DOPPLER ECHO F-UP/LMTD STD: CPT | Mod: 26

## 2024-10-04 PROCEDURE — 99232 SBSQ HOSP IP/OBS MODERATE 35: CPT

## 2024-10-04 PROCEDURE — 99233 SBSQ HOSP IP/OBS HIGH 50: CPT

## 2024-10-04 RX ORDER — SACUBITRIL AND VALSARTAN 97; 103 MG/1; MG/1
1 TABLET, FILM COATED ORAL
Qty: 60 | Refills: 0
Start: 2024-10-04 | End: 2024-11-02

## 2024-10-04 RX ORDER — SACUBITRIL AND VALSARTAN 97; 103 MG/1; MG/1
1 TABLET, FILM COATED ORAL
Qty: 0 | Refills: 0 | DISCHARGE
Start: 2024-10-04

## 2024-10-04 RX ORDER — CHLORHEXIDINE GLUCONATE ORAL RINSE 1.2 MG/ML
1 SOLUTION DENTAL
Refills: 0 | Status: DISCONTINUED | OUTPATIENT
Start: 2024-10-04 | End: 2024-10-04

## 2024-10-04 RX ADMIN — SACUBITRIL AND VALSARTAN 1 TABLET(S): 97; 103 TABLET, FILM COATED ORAL at 11:16

## 2024-10-04 NOTE — DISCHARGE NOTE PROVIDER - DETAILS OF MALNUTRITION DIAGNOSIS/DIAGNOSES
This patient has been assessed with a concern for Malnutrition and was treated during this hospitalization for the following Nutrition diagnosis/diagnoses:     -  10/01/2024: Moderate protein-calorie malnutrition

## 2024-10-04 NOTE — DISCHARGE NOTE PROVIDER - HOSPITAL COURSE
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 Pt seen by [facility] and had CT which showed large pericardial effusion pressing on his R ventricle and small L hydropneumothorax. Endorses decreased appetite and PO intake, JIMENEZ, and cough. Denies fevers. States he is on chemo every 4 weeks. Nonsmoker.    10/1: Patient seen, no CP, EF now 25%, and going with interventional cardio for drainage   10/2: No events over night, 100 cc from pericardial drain, for possible cath today.  10/3 Case discussed on CCU rounds, pericardial drain removed, doing well.  10/4 Case discussed on CCU rounds, cleared for DC by Cardiology.    ICU Vital Signs Last 24 Hrs  T(C): 37.1 (04 Oct 2024 06:20), Max: 37.8 (03 Oct 2024 21:00)  T(F): 98.7 (04 Oct 2024 06:20), Max: 100.1 (03 Oct 2024 21:00)  HR: 115 (04 Oct 2024 06:00) (106 - 123)  BP: 90/69 (04 Oct 2024 06:00) (86/68 - 126/82)  BP(mean): 77 (04 Oct 2024 06:00) (76 - 96)  RR: 21 (04 Oct 2024 06:00) (16 - 30)  SpO2: 97% (04 Oct 2024 06:00) (96% - 100%)                                8.9    5.01  )-----------( 84       ( 04 Oct 2024 05:10 )             28.5         10-04    134[L]  |  105  |  20  ----------------------------<  107[H]  4.0   |  21[L]  |  1.07    Ca    8.2[L]      04 Oct 2024 05:10  Phos  3.4     10-04  Mg     2.1     10-04    TPro  6.2  /  Alb  2.2[L]  /  TBili  0.5  /  DBili  x   /  AST  30  /  ALT  39  /  AlkPhos  130[H]  10-04    LIVER FUNCTIONS - ( 04 Oct 2024 05:10 )  Alb: 2.2 g/dL / Pro: 6.2 gm/dL / ALK PHOS: 130 U/L / ALT: 39 U/L / AST: 30 U/L / GGT: x

## 2024-10-04 NOTE — PROGRESS NOTE ADULT - SUBJECTIVE AND OBJECTIVE BOX
Cath Lab Nurse Practitioner Note  HPI:  53 y/o Male with a PMH of chronic neck pain, HTN, H pylori ulcer, metastatic SCLC lung CA on chemo and radiation last treatment 3 weeks ago presents to the ED c/o chest pain and SOB x2 weeks and low hgb. Pt seen by [facility] and had CT which showed large pericardial effusion pressing on his R ventricle and small L hydropneumothorax. Endorses decreased appetite and PO intake, JIMENEZ, and cough. Denies fevers. States he is on chemo every 4 weeks. Nonsmoker.    10/1: Patient seen, no CP, EF now 25%, and going with interventional cardio for drainage  (01 Oct 2024 11:29)  10/2 s/p LHC : revealing non obs disease, slightly improved EF   10/3 Pt resting comfortably in bed, NAD, denies SOB/CP. Drain set to gravity with cloudy, yellow fluid drainage has slowed. Pericardial fluid analysis reveals RBC 1000, malignancy can not be ruled out.    10/4/24: s/p drain removal - no cp or SOB. TTE done with no reaccumulation of pericardial effusion fluid     INTERVAL/OVERNIGHT EVENTS  drain removed last evening- no chest pain or sob overnight    Vital Signs Last 24 HrsVital Signs Last 24 Hrs  T(C): 37.1 (04 Oct 2024 06:20), Max: 37.8 (03 Oct 2024 21:00)  T(F): 98.7 (04 Oct 2024 06:20), Max: 100.1 (03 Oct 2024 21:00)  HR: 115 (04 Oct 2024 06:00) (106 - 123)  BP: 90/69 (04 Oct 2024 06:00) (86/68 - 126/82)  BP(mean): 77 (04 Oct 2024 06:00) (76 - 96)  RR: 21 (04 Oct 2024 06:00) (16 - 30)  SpO2: 97% (04 Oct 2024 06:00) (96% - 100%)        PHYSICAL EXAM:  NEURO: Non-focal, AxOx3.  No neuro deficits NECK: Supple, No JVD, No LAD  CHEST/LUNG: Clear to auscultation bilaterally; No wheeze  HEART: s1 s2 IRRR; No murmurs, rubs, or gallops  ABDOMEN: Soft, Nontender, Nondistended; Bowel sounds present X 4 quadrants   EXTREMITIES:  2+ Peripheral Pulses, No clubbing, cyanosis, or edema   VASCULAR: Peripheral pulses palpable 2+ bilaterally  PROCEDURE SITE: LCW with sutured drain to gravity ,Site is without hematoma or bleeding. Sensation and PEREZ intact. Distal pulses palpable 2+, capillary refill < 2 seconds. Patient denies pain, numbness, tingling, CP or SOB. Clean dry dressing applied    Labs:           LABS: All Labs Reviewed:                        8.9    5.01  )-----------( 84       ( 04 Oct 2024 05:10 )             28.5     10-04    134[L]  |  105  |  20  ----------------------------<  107[H]  4.0   |  21[L]  |  1.07    Ca    8.2[L]      04 Oct 2024 05:10  Phos  3.4     10-04  Mg     2.1     10-04    TPro  6.2  /  Alb  2.2[L]  /  TBili  0.5  /  DBili  x   /  AST  30  /  ALT  39  /  AlkPhos  130[H]  10-04            Urinalysis Basic - ( 04 Oct 2024 05:10 )    Color: x / Appearance: x / SG: x / pH: x  Gluc: 107 mg/dL / Ketone: x  / Bili: x / Urobili: x   Blood: x / Protein: x / Nitrite: x   Leuk Esterase: x / RBC: x / WBC x   Sq Epi: x / Non Sq Epi: x / Bacteria: x                       9.0    4.54  )-----------( 78       ( 03 Oct 2024 05:49 )             28.6     10-03    137  |  106  |  21  ----------------------------<  113[H]  4.3   |  23  |  1.17    Ca    8.0[L]      03 Oct 2024 05:49  Phos  3.6     10-03  Mg     2.0     10-03    Cell Count, Body Fluid (10.01.24 @ 15:46)    Mesothelial Cells - Body Fluid: 5 %   Tube Type: Lavendar   Fluid Type: Pericardial Fluid   Body Fluid Appearance: Clear   Color - Body Fluid: Yellow   Total Nucleated Cell Count, Body Fluid: 204: Reference Ranges:  Synovial Fluid  Total nucleated cells < 150 cells/uL  Neutrophils <25%  Lymphocytes 10-15%  Monocytes/Macrophages </=70%  Other parameters- No established reference ranges.  Bronchoalveolar lavage  Macrophages >85%  Lymphocytes 10-15%  Neutrophils <3%  Eosinophils <1%  Other parameters- No established reference ranges.  Peritoneal/pleural/pericardial fluid/other body fluid types  No established reference ranges. /uL   Total RBC Count: 1000 /uL   Neutrophils-Body Fluid: 2 %   BF Lymphocytes: 17 %   Atypical Lymphocytes - Body Fluid: 0 %   Monocyte/Macrophage Count - Body Fluid: 64 %   Eosinophil Count - Body Fluid: 0 %   Nucleated Red Blood Cells - Body Fluid: 0 %   Other Body Cells: 12 %        MEDICATIONS  (STANDING):MEDICATIONS  (STANDING):  influenza   Vaccine 0.5 milliLiter(s) IntraMuscular once  sacubitril 24 mG/valsartan 26 mG 1 Tablet(s) Oral two times a day    MEDICATIONS  (PRN):  metoclopramide 10 milliGRAM(s) Oral Before meals and at bedtime PRN for nausea

## 2024-10-04 NOTE — PROGRESS NOTE ADULT - ASSESSMENT
55 y/o Male with a PMH of metastatic NSCLC lung CA on tagrisso and chemo, last treatment 3 weeks ago presents to the ED c/o chest pain and SOB x2 weeks and low hgb. Pt seen by [facility] and had CT which showed large pericardial effusion pressing on his R ventricle and small L hydropneumothorax.    # metastatic NSCLC  - [place] [facility] [place], alimta, avastin- LD 9/10/24  - pt follows with DR. Larry Bustillo  - will hold tagrisso while inpatient    # pericardial effusion  - TTE with EF now 25%,  - patient now s/p recent LEFT HEART CATH - - non obstructive CAD   - cytology presently pending - fluid was sangrinous.   -= pericardial drain pulled   # CHF  - cardio folliwng     will follow   
55 y/o M PMHx of chronic neck pain, HTN, H pylori ulcer, metastatic SCLC lung CA on chemo and radiation last treatment 3 weeks ago presents to the ED c/o chest pain and SOB x2 weeks and low hgb. Pt seen by [facility] today, had CT which showed large pericardial effusion pressing on his R ventricle and L hydropneumothorax. Endorses decreased appetite and PO intake, JIMENEZ, and cough. Denies fevers. States he is on chemo every 4 weeks. Nonsmoker. 10/1 s/p Pericardiocentesis with Interventional cardiology     Plan   Cytology P   Follow up repeat ECHO post procedure   will cont to follow  Cont care as per primary team   DW Dr Saenz 
55 y/o M PMHx of chronic neck pain, HTN, H pylori ulcer, metastatic SCLC lung CA on chemo and radiation last treatment 3 weeks ago presents to the ED c/o chest pain and SOB x2 weeks and low hgb. Pt seen by [facility] today, had CT which showed large pericardial effusion pressing on his R ventricle and L hydropneumothorax. Endorses decreased appetite and PO intake, JIMENEZ, and cough. Denies fevers. States he is on chemo every 4 weeks. Nonsmoker. 10/1 s/p Pericardiocentesis with Interventional cardiology Exudative. S/p left heart cath 10/2/24 nonobstructive dz    Plan   Cytology P    Cont care as per primary team  ECHO post drainge w small pericardial effusion   no thoracic interventions at this time   WIll sign off     Discussed with Cardiothoracic Team at AM rounds.
55 y/o Male with a PMH of chronic neck pain, HTN, H pylori ulcer, metastatic SCLC lung CA on chemo and radiation last treatment 3 weeks ago presents to the ED c/o chest pain and SOB x2 weeks and low hgb. Pt seen by [facility] and had CT which showed large pericardial effusion pressing on his R ventricle and small L hydropneumothorax. In ER pt. was noted to have a large pericardial effusion, early tamponade. Prior to procedure patient to receive 1 bag of platelets for platelet count of 31    -Pt. to have pericardiocentesis today   Thank you for allowing us to consult this patient
55 y/o Male with a PMH of metastatic NSCLC lung CA on tagrisso and chemo, last treatment 3 weeks ago presents to the ED c/o chest pain and SOB x2 weeks and low hgb. Pt seen by [facility] and had CT which showed large pericardial effusion pressing on his R ventricle and small L hydropneumothorax.    # metastatic NSCLC  - [place] at [facility] [place] tagrisso, alimta, avastin- LD 9/10/24  - pt follows with DR. Larry Bustillo  - will hold tagrisso while inpatient  - tagrisso can cause CHF and decrease EF  - no known history of systolic heart failure and pt has been on tagrisso for over a year     # pericardial effusion  -  CT chest- Large pericardial effusion with extrinsic compression of the right ventricle/atrium. Small right pleural effusion. Small left anterior medial upper lobe hydropneumothorax. Left basilar consolidation, may be related to the patient's known malignancy. Left-sided pleural plaques, may be related to the patient's known malignancy and/or radiationPts wife at bedside this morning, was on RA and comfortable. TTE performed post pericardiocentesis with small effusion now cytology to be sent   - WBC 4.1, HB 7.6, plt 31k - given 1u plts prior to pericardiocentesis - 610 cc drained - straw colored fluid   - TTE with EF now 25%,  - plan to add motrin if no evidence of malignant cells in cytology     # CHF  - cardio adding los dose BB, entresto once pericardiocentesis is done with low dose diuretic    will follow   
55 y/o Male with a PMH of metastatic NSCLC lung CA on tagrisso and chemo, last treatment 3 weeks ago presents to the ED c/o chest pain and SOB x2 weeks and low hgb. Pt seen by [facility] and had CT which showed large pericardial effusion pressing on his R ventricle and small L hydropneumothorax.    # metastatic NSCLC  - [place] [facility] [place], alimta, avastin- LD 9/10/24  - pt follows with DR. Larry Bustillo  - will hold tagrisso while inpatient    # pericardial effusion  - TTE with EF now 25%,  - patient now s/p recent LEFT HEART CATH - - non obstructive CAD   - cytology positive for metastatic adenocarcinoma c/w NSCLC  -pericardial drain pulled   - pt remains tachy  - repeat TTE as per cardio negative for reaccumulation of pericardial effusion     # CHF  - cardio following   - as per interventional cardiology limited TTE with no reaccumulation of pericardial effusion    will follow   
Plan/recommendation   - limited TTE with no reaccumulation of pericardial effusion  - optimized for d/c from IC Standpoint; general cardiology to follow as outpatient   --Remainder of care to be addressed by primary team/ general cardiology   - thank you for allowing us to participate in the care of this patient. no further interventional cardiology input at this time, please don't hesitant to reconsult if new clinical issues arise 
5
A/P: 54 male with Met SSLCA who is presenting with a symptomatic pericardial effusion    Plan:   CCU    Pulm: Small Effusion and a tiny anterior and superior hydro pneum.  Will need to be followed.  Not likely Pneumonia.  WBC normal    Cardio: Continue Drainage, possible cath today for new low EF, will need a repeat ECHO post drainage    Renal: D/C LR at 125    GI: NPO for possible cath    Venodynes

## 2024-10-04 NOTE — DISCHARGE NOTE PROVIDER - NSDCCPCAREPLAN_GEN_ALL_CORE_FT
PRINCIPAL DISCHARGE DIAGNOSIS  Diagnosis: Acute pericardial effusion  Assessment and Plan of Treatment:

## 2024-10-04 NOTE — PROGRESS NOTE ADULT - PROBLEM SELECTOR PLAN 1
Small residual effusion on today's TTE.   TTE images reviewed in person for independent interpretation.  Follow up in 1 week.

## 2024-10-04 NOTE — PROGRESS NOTE ADULT - PROVIDER SPECIALTY LIST ADULT
Cardiology
Intervent Cardiology
Thoracic Surgery
Intervent Cardiology
Heme/Onc
Thoracic Surgery
Thoracic Surgery
Cardiology
Cardiology
Critical Care
Critical Care

## 2024-10-04 NOTE — PROGRESS NOTE ADULT - PROBLEM SELECTOR PROBLEM 3
Shortness of breath
Acute systolic congestive heart failure

## 2024-10-04 NOTE — PROGRESS NOTE ADULT - PROBLEM SELECTOR PLAN 2
s/p drainage  stable  drain removed
Large pericardial effusion; s/p pericardiocentesis on 10/1/24 with 610 cc removed; still with output from drain.  Await cytopathology.
No current signs of fluid overload.   Continue Entresto   Low BP  Beta-blocker to be initiated as outpt.
large pericardial effusion likely metastatic pericardium. Improved today. Hemodynamically improved.

## 2024-10-04 NOTE — PROGRESS NOTE ADULT - PROBLEM SELECTOR PROBLEM 2
Acute systolic congestive heart failure
Pericardial effusion, acute

## 2024-10-04 NOTE — DISCHARGE NOTE PROVIDER - CARE PROVIDER_API CALL
Inform pt she should take macrobid bid 7 days, crush pill and give it with apple sauce   Cornell Price  Cardiovascular Disease  [address] [facility], [address] [address]  Phone: [phone]  Fax: [phone]  Follow Up Time:

## 2024-10-04 NOTE — PROGRESS NOTE ADULT - SUBJECTIVE AND OBJECTIVE BOX
Interval history:  Reports no chest pain or dyspnea    Acute pericarditis    Handoff    MEWS Score    H pylori ulcer    HTN (hypertension)    Chronic neck pain    Chronic back pain    H pylori ulcer    Mild HTN    HTN (hypertension)    Chronic neck pain    Pericardial effusion    Nonobstructive atherosclerosis of coronary artery    Pericardial tamponade    Acute systolic congestive heart failure    Acute pericardial effusion    Shortness of breath    Pericardial effusion, acute    Acute systolic congestive heart failure    Pericardiocentesis    Left heart cardiac cath    CHEST PAIN    90+    SysAdmin_VisitLink        (ADM OVERRIDE)   1 Each &lt;See Task&gt; (10-02-24 @ 12:18)    (ADM OVERRIDE)   1 Each &lt;See Task&gt; (10-02-24 @ 12:18)    (ADM OVERRIDE)   1 Each &lt;See Task&gt; (10-02-24 @ 12:18)    (ADM OVERRIDE)   1 Each &lt;See Task&gt; (10-02-24 @ 12:18)    sacubitril 24 mG/valsartan 26 mG   1 Tablet(s) Oral (10-03-24 @ 21:18)   1 Tablet(s) Oral (10-03-24 @ 11:14)   1 Tablet(s) Oral (10-02-24 @ 21:22)        influenza   Vaccine 0.5 milliLiter(s) IntraMuscular once  metoclopramide 10 milliGRAM(s) Oral Before meals and at bedtime PRN  sacubitril 24 mG/valsartan 26 mG 1 Tablet(s) Oral two times a day      T(C): 37.1 (10-04-24 @ 06:20), Max: 37.8 (10-03-24 @ 21:00)  HR: 115 (10-04-24 @ 06:00) (106 - 123)  BP: 90/69 (10-04-24 @ 06:00) (86/68 - 126/82)  RR: 21 (10-04-24 @ 06:00) (16 - 30)  SpO2: 97% (10-04-24 @ 06:00) (96% - 100%)    10-03-24 @ 07:01  -  10-04-24 @ 07:00  --------------------------------------------------------  IN: 780 mL / OUT: 500 mL / NET: 280 mL      Weight (kg): 66.4 (24 @ 19:50)  Daily     Daily Weight in k.2 (04 Oct 2024 06:20)    Gen: no distress  CV: normal S1 and S2  Resp: Lungs CTA    10-04    134[L]  |  105  |  20  ----------------------------<  107[H]  4.0   |  21[L]  |  1.07    Ca    8.2[L]      04 Oct 2024 05:10  Phos  3.4     10-04  Mg     2.1     10-04    TPro  6.2  /  Alb  2.2[L]  /  TBili  0.5  /  DBili  x   /  AST  30  /  ALT  39  /  AlkPhos  130[H]  10-04    Magnesium: 2.1 mg/dL (10-04-24 @ 05:10)  Magnesium: 2.0 mg/dL (10-03-24 @ 05:49)                          8.9    5.01  )-----------( 84       ( 04 Oct 2024 05:10 )             28.5

## 2024-10-04 NOTE — PROGRESS NOTE ADULT - SUBJECTIVE AND OBJECTIVE BOX
Subjective: Pt in bed NAD no issues overnight . Pt reports feeling better . Repeat ECHO with Small pericardial effusion . Drain removed yesterday     T(C): 37.1 (10-04-24 @ 06:20), Max: 37.8 (10-03-24 @ 21:00)  HR: 115 (10-04-24 @ 06:00) (106 - 123)  BP: 90/69 (10-04-24 @ 06:00) (86/68 - 126/82)  ABP: --  ABP(mean): --  RR: 21 (10-04-24 @ 06:00) (16 - 30)  SpO2: 97% (10-04-24 @ 06:00) (96% - 100%) RA   Wt(kg): --  CVP(mm Hg): --  CO: --  CI: --  PA: --                                              Tele: SR             10-04    134[L]  |  105  |  20  ----------------------------<  107[H]  4.0   |  21[L]  |  1.07    Ca    8.2[L]      04 Oct 2024 05:10  Phos  3.4     10-04  Mg     2.1     10-04    TPro  6.2  /  Alb  2.2[L]  /  TBili  0.5  /  DBili  x   /  AST  30  /  ALT  39  /  AlkPhos  130[H]  10-04                               8.9    5.01  )-----------( 84       ( 04 Oct 2024 05:10 )             28.5                 CAPILLARY BLOOD GLUCOSE         < from: TTE Limited W or WO Ultrasound Enhancing Agent (10.04.24 @ 07:20) >  FINDINGS:     Left Ventricle:  Left ventricular systolic function is severely decreased with a calculated ejection fraction of 27 % by the Marshall's biplane method of disks.     Pericardium:  There is trace to small pericardial effusion anterior to the right ventricle. This was supported by evidence of, inferior vena cava not plethoric and does not display blunted respiratory variation, respiratory variation across the mitral valve E wave is not greater than 30% and no early diastolic inversion of the right ventricle.     Systemic Veins:  The inferior vena cava is normal in size measuring 0.78 cm in diameter, (normal <2.1cm) with normal inspiratory collapse (normal >50%) consistent with normal right atrial pressure (~3, range 0-5mmHg).  ____________________________________________________________________    < end of copied text >                Exam   Neuro: Alert Awake   Pulm: decreased at bases   CV: tachy S1 S2  Abd:  soft   Extremities: warm           Assessment:  54yMale    with PAST MEDICAL & SURGICAL HISTORY:  H pylori ulcer      HTN (hypertension)      Chronic neck pain

## 2024-10-04 NOTE — PROGRESS NOTE ADULT - SUBJECTIVE AND OBJECTIVE BOX
INTERVAL HPI/OVERNIGHT EVENTS:  Patient S&E at bedside.   reviewed path with patient c/w metastatic adenocarcinoma   WBC 5, Hb 8.9, plt 84, an 134  nopain reported today   repeat tte pending   tachy today     PAST MEDICAL & SURGICAL HISTORY:  H pylori ulcer      HTN (hypertension)      Chronic neck pain          FAMILY HISTORY:      VITAL SIGNS:  T(F): 98.7 (10-04-24 @ 06:20)  HR: 115 (10-04-24 @ 06:00)  BP: 90/69 (10-04-24 @ 06:00)  RR: 21 (10-04-24 @ 06:00)  SpO2: 97% (10-04-24 @ 06:00)  Wt(kg): --    PHYSICAL EXAM:    Constitutional: NAD  Eyes: EOMI,   Respiratory: CTA b/l  Cardiovascular: tachycardic   Gastrointestinal: soft, NTND,     Neurological: AAOx3      MEDICATIONS  (STANDING):  influenza   Vaccine 0.5 milliLiter(s) IntraMuscular once  sacubitril 24 mG/valsartan 26 mG 1 Tablet(s) Oral two times a day    MEDICATIONS  (PRN):  metoclopramide 10 milliGRAM(s) Oral Before meals and at bedtime PRN for nausea      Allergies    No Known Allergies    Intolerances        LABS:                        8.9    5.01  )-----------( 84       ( 04 Oct 2024 05:10 )             28.5     10-04    134[L]  |  105  |  20  ----------------------------<  107[H]  4.0   |  21[L]  |  1.07    Ca    8.2[L]      04 Oct 2024 05:10  Phos  3.4     10-04  Mg     2.1     10-04    TPro  6.2  /  Alb  2.2[L]  /  TBili  0.5  /  DBili  x   /  AST  30  /  ALT  39  /  AlkPhos  130[H]  10-04      Urinalysis Basic - ( 04 Oct 2024 05:10 )    Color: x / Appearance: x / SG: x / pH: x  Gluc: 107 mg/dL / Ketone: x  / Bili: x / Urobili: x   Blood: x / Protein: x / Nitrite: x   Leuk Esterase: x / RBC: x / WBC x   Sq Epi: x / Non Sq Epi: x / Bacteria: x        RADIOLOGY & ADDITIONAL TESTS:  Studies reviewed.

## 2024-10-04 NOTE — DISCHARGE NOTE PROVIDER - NSDCMRMEDTOKEN_GEN_ALL_CORE_FT
dexAMETHasone 1 mg oral tablet: 1 tab(s) orally once a day  folic acid 1 mg oral tablet: 1 tab(s) orally every 4 weeks after Chemo  metoclopramide 10 mg oral tablet: 1 tab(s) orally 4 times a day (before meals and at bedtime) as needed for  nausea  sacubitril-valsartan 24 mg-26 mg oral tablet: 1 tab(s) orally 2 times a day  sacubitril-valsartan 24 mg-26 mg oral tablet: 1 tab(s) orally 2 times a day MDD: 2  THC Gummy as needed:

## 2024-10-04 NOTE — DISCHARGE NOTE NURSING/CASE MANAGEMENT/SOCIAL WORK - PATIENT PORTAL LINK FT
You can access the FollowMyHealth Patient Portal offered by [facility] by registering at the following website: [email or web address]. By joining [employer]’s FollowMyHealth portal, you will also be able to view your health information using other applications (apps) compatible with our system.

## 2024-10-06 LAB
CULTURE RESULTS: SIGNIFICANT CHANGE UP
SPECIMEN SOURCE: SIGNIFICANT CHANGE UP

## 2024-10-06 NOTE — PROCEDURE
[de-identified] : Reason for procedure: LEFT CERVICAL MYOFASCIAL PAIN\par \par Procedure: TRIGGER POINT INJECTIONS\par Physician: LUIS ANGEL SILVA DO\par Medication injected: 1 cc dexamethasone, 3 cc Lidocaine 1% (total)\par Sedation medications: None\par Estimated blood loss: None\par Complications: None\par \par Technique: R/B/A to LEFT MID-UPPER CERVICAL trigger point injections reviewed with patient. The patient is agreeable and wishes to proceed.  Signed consent form to be scanned into EMR.  The patient was placed in a seated position and 3 trigger points of  LEFT MID-UPPER CERVICAL PARAVERTEBRAL MUSCLES were identified. The areas to be injected were prepped in normal sterile fashion with Chloroprep.  A 27 gauge, 1.25 inch needle was advanced into each palpable trigger point with reproduction of pain.  After negative aspiration of heme, the above medications were injected into the trigger area. Needle was then removed, band aid placed over injection sites. There were no complications.  The patient was provided with post injection instructions and noted improvement in pain and ROM after injection. \par \par  1 Principal Discharge DX:	Dizziness  Secondary Diagnosis:	Shortness of breath

## 2024-10-09 LAB — COMMENT - FLUIDS: SIGNIFICANT CHANGE UP

## 2024-10-10 ENCOUNTER — APPOINTMENT (OUTPATIENT)
Dept: CARDIOLOGY | Facility: CLINIC | Age: 54
End: 2024-10-10
Payer: MEDICAID

## 2024-10-10 VITALS
DIASTOLIC BLOOD PRESSURE: 74 MMHG | WEIGHT: 136 LBS | HEART RATE: 114 BPM | BODY MASS INDEX: 21.35 KG/M2 | HEIGHT: 67 IN | OXYGEN SATURATION: 98 % | SYSTOLIC BLOOD PRESSURE: 116 MMHG

## 2024-10-10 DIAGNOSIS — I50.22 CHRONIC SYSTOLIC (CONGESTIVE) HEART FAILURE: ICD-10-CM

## 2024-10-10 DIAGNOSIS — I31.39 OTHER PERICARDIAL EFFUSION (NONINFLAMMATORY): ICD-10-CM

## 2024-10-10 PROCEDURE — 93000 ELECTROCARDIOGRAM COMPLETE: CPT

## 2024-10-10 PROCEDURE — 99214 OFFICE O/P EST MOD 30 MIN: CPT | Mod: 25

## 2024-10-10 RX ORDER — SACUBITRIL AND VALSARTAN 24; 26 MG/1; MG/1
24-26 TABLET, FILM COATED ORAL TWICE DAILY
Refills: 0 | Status: ACTIVE | COMMUNITY

## 2024-10-10 RX ORDER — METOPROLOL SUCCINATE 25 MG/1
25 TABLET, EXTENDED RELEASE ORAL DAILY
Qty: 30 | Refills: 5 | Status: ACTIVE | COMMUNITY
Start: 2024-10-10 | End: 1900-01-01

## 2024-10-10 RX ORDER — ONDANSETRON 8 MG/1
8 TABLET, ORALLY DISINTEGRATING ORAL DAILY
Refills: 0 | Status: ACTIVE | COMMUNITY

## 2024-10-10 RX ORDER — OSIMERTINIB 80 1/1
80 TABLET, FILM COATED ORAL DAILY
Refills: 0 | Status: ACTIVE | COMMUNITY

## 2024-10-10 RX ORDER — DEXAMETHASONE 4 MG/1
4 TABLET ORAL DAILY
Refills: 0 | Status: ACTIVE | COMMUNITY

## 2024-10-10 RX ORDER — DEXAMETHASONE 2 MG/1
2 TABLET ORAL DAILY
Refills: 0 | Status: ACTIVE | COMMUNITY

## 2024-10-10 RX ORDER — METOCLOPRAMIDE 10 MG/1
10 TABLET ORAL DAILY
Refills: 0 | Status: ACTIVE | COMMUNITY

## 2024-10-12 DIAGNOSIS — I42.8 OTHER CARDIOMYOPATHIES: ICD-10-CM

## 2024-10-12 DIAGNOSIS — I11.0 HYPERTENSIVE HEART DISEASE WITH HEART FAILURE: ICD-10-CM

## 2024-10-12 DIAGNOSIS — M54.2 CERVICALGIA: ICD-10-CM

## 2024-10-12 DIAGNOSIS — I31.31 MALIGNANT PERICARDIAL EFFUSION IN DISEASES CLASSIFIED ELSEWHERE: ICD-10-CM

## 2024-10-12 DIAGNOSIS — R71.0 PRECIPITOUS DROP IN HEMATOCRIT: ICD-10-CM

## 2024-10-12 DIAGNOSIS — I50.21 ACUTE SYSTOLIC (CONGESTIVE) HEART FAILURE: ICD-10-CM

## 2024-10-12 DIAGNOSIS — C34.90 MALIGNANT NEOPLASM OF UNSPECIFIED PART OF UNSPECIFIED BRONCHUS OR LUNG: ICD-10-CM

## 2024-10-12 DIAGNOSIS — G89.29 OTHER CHRONIC PAIN: ICD-10-CM

## 2024-10-12 DIAGNOSIS — I31.4 CARDIAC TAMPONADE: ICD-10-CM

## 2024-10-12 DIAGNOSIS — I25.10 ATHEROSCLEROTIC HEART DISEASE OF NATIVE CORONARY ARTERY WITHOUT ANGINA PECTORIS: ICD-10-CM

## 2024-10-12 DIAGNOSIS — E44.0 MODERATE PROTEIN-CALORIE MALNUTRITION: ICD-10-CM

## 2024-10-12 DIAGNOSIS — K25.9 GASTRIC ULCER, UNSPECIFIED AS ACUTE OR CHRONIC, WITHOUT HEMORRHAGE OR PERFORATION: ICD-10-CM

## 2024-10-23 ENCOUNTER — APPOINTMENT (OUTPATIENT)
Dept: CARDIOLOGY | Facility: CLINIC | Age: 54
End: 2024-10-23
Payer: MEDICAID

## 2024-10-23 PROCEDURE — 93306 TTE W/DOPPLER COMPLETE: CPT

## 2024-11-07 ENCOUNTER — APPOINTMENT (OUTPATIENT)
Dept: CARDIOLOGY | Facility: CLINIC | Age: 54
End: 2024-11-07
Payer: MEDICAID

## 2024-11-07 VITALS
HEART RATE: 108 BPM | OXYGEN SATURATION: 98 % | DIASTOLIC BLOOD PRESSURE: 70 MMHG | HEIGHT: 67 IN | BODY MASS INDEX: 20.72 KG/M2 | SYSTOLIC BLOOD PRESSURE: 101 MMHG | WEIGHT: 132 LBS

## 2024-11-07 DIAGNOSIS — I50.22 CHRONIC SYSTOLIC (CONGESTIVE) HEART FAILURE: ICD-10-CM

## 2024-11-07 DIAGNOSIS — I31.39 OTHER PERICARDIAL EFFUSION (NONINFLAMMATORY): ICD-10-CM

## 2024-11-07 PROCEDURE — G2211 COMPLEX E/M VISIT ADD ON: CPT | Mod: NC

## 2024-11-07 PROCEDURE — 99213 OFFICE O/P EST LOW 20 MIN: CPT

## 2024-11-07 RX ORDER — SACUBITRIL AND VALSARTAN 24; 26 MG/1; MG/1
24-26 TABLET, FILM COATED ORAL TWICE DAILY
Qty: 60 | Refills: 5 | Status: ACTIVE | COMMUNITY
Start: 2024-11-07 | End: 1900-01-01

## 2024-12-12 ENCOUNTER — APPOINTMENT (OUTPATIENT)
Dept: CARDIOLOGY | Facility: CLINIC | Age: 54
End: 2024-12-12
Payer: MEDICAID

## 2024-12-12 PROCEDURE — 93308 TTE F-UP OR LMTD: CPT

## 2024-12-19 ENCOUNTER — APPOINTMENT (OUTPATIENT)
Dept: CARDIOLOGY | Facility: CLINIC | Age: 54
End: 2024-12-19